# Patient Record
Sex: FEMALE | Race: OTHER | Employment: OTHER | URBAN - METROPOLITAN AREA
[De-identification: names, ages, dates, MRNs, and addresses within clinical notes are randomized per-mention and may not be internally consistent; named-entity substitution may affect disease eponyms.]

---

## 2018-09-01 ENCOUNTER — HOSPITAL ENCOUNTER (INPATIENT)
Facility: HOSPITAL | Age: 83
LOS: 1 days | Discharge: HOME HEALTH | DRG: 552 | End: 2018-09-02
Attending: EMERGENCY MEDICINE | Admitting: HOSPITALIST
Payer: MEDICARE

## 2018-09-01 ENCOUNTER — APPOINTMENT (OUTPATIENT)
Dept: MRI IMAGING | Facility: HOSPITAL | Age: 83
DRG: 552 | End: 2018-09-01
Attending: EMERGENCY MEDICINE
Payer: MEDICARE

## 2018-09-01 ENCOUNTER — APPOINTMENT (OUTPATIENT)
Dept: CT IMAGING | Facility: HOSPITAL | Age: 83
DRG: 552 | End: 2018-09-01
Attending: EMERGENCY MEDICINE
Payer: MEDICARE

## 2018-09-01 DIAGNOSIS — R29.898 WEAKNESS OF RIGHT LOWER EXTREMITY: Primary | ICD-10-CM

## 2018-09-01 LAB
ANION GAP SERPL CALC-SCNC: 11 MMOL/L (ref 0–18)
BASOPHILS # BLD: 0 K/UL (ref 0–0.2)
BASOPHILS NFR BLD: 1 %
BILIRUB UR QL: NEGATIVE
BUN SERPL-MCNC: 38 MG/DL (ref 8–20)
BUN/CREAT SERPL: 22.4 (ref 10–20)
CALCIUM SERPL-MCNC: 9.2 MG/DL (ref 8.5–10.5)
CHLORIDE SERPL-SCNC: 106 MMOL/L (ref 95–110)
CLARITY UR: CLEAR
CO2 SERPL-SCNC: 20 MMOL/L (ref 22–32)
COLOR UR: YELLOW
CREAT SERPL-MCNC: 1.7 MG/DL (ref 0.5–1.5)
EOSINOPHIL # BLD: 0.1 K/UL (ref 0–0.7)
EOSINOPHIL NFR BLD: 1 %
ERYTHROCYTE [DISTWIDTH] IN BLOOD BY AUTOMATED COUNT: 14.4 % (ref 11–15)
GLUCOSE SERPL-MCNC: 116 MG/DL (ref 70–99)
GLUCOSE UR-MCNC: NEGATIVE MG/DL
HCT VFR BLD AUTO: 40.3 % (ref 35–48)
HGB BLD-MCNC: 13.4 G/DL (ref 12–16)
HYALINE CASTS #/AREA URNS AUTO: 1 /LPF
KETONES UR-MCNC: NEGATIVE MG/DL
LYMPHOCYTES # BLD: 0.8 K/UL (ref 1–4)
LYMPHOCYTES NFR BLD: 12 %
MCH RBC QN AUTO: 29.1 PG (ref 27–32)
MCHC RBC AUTO-ENTMCNC: 33.2 G/DL (ref 32–37)
MCV RBC AUTO: 87.7 FL (ref 80–100)
MONOCYTES # BLD: 0.4 K/UL (ref 0–1)
MONOCYTES NFR BLD: 6 %
NEUTROPHILS # BLD AUTO: 5.2 K/UL (ref 1.8–7.7)
NEUTROPHILS NFR BLD: 80 %
NITRITE UR QL STRIP.AUTO: NEGATIVE
OSMOLALITY UR CALC.SUM OF ELEC: 294 MOSM/KG (ref 275–295)
PH UR: 5 [PH] (ref 5–8)
PLATELET # BLD AUTO: 158 K/UL (ref 140–400)
PMV BLD AUTO: 10.6 FL (ref 7.4–10.3)
POTASSIUM SERPL-SCNC: 4.1 MMOL/L (ref 3.3–5.1)
PROT UR-MCNC: NEGATIVE MG/DL
RBC # BLD AUTO: 4.6 M/UL (ref 3.7–5.4)
RBC #/AREA URNS AUTO: 2 /HPF
SODIUM SERPL-SCNC: 137 MMOL/L (ref 136–144)
SP GR UR STRIP: 1.01 (ref 1–1.03)
UROBILINOGEN UR STRIP-ACNC: <2
VIT C UR-MCNC: NEGATIVE MG/DL
WBC # BLD AUTO: 6.4 K/UL (ref 4–11)
WBC #/AREA URNS AUTO: 1 /HPF

## 2018-09-01 PROCEDURE — 99222 1ST HOSP IP/OBS MODERATE 55: CPT | Performed by: HOSPITALIST

## 2018-09-01 PROCEDURE — 70450 CT HEAD/BRAIN W/O DYE: CPT | Performed by: EMERGENCY MEDICINE

## 2018-09-01 PROCEDURE — 72148 MRI LUMBAR SPINE W/O DYE: CPT | Performed by: EMERGENCY MEDICINE

## 2018-09-01 RX ORDER — METOPROLOL SUCCINATE 50 MG/1
50 TABLET, EXTENDED RELEASE ORAL DAILY
COMMUNITY

## 2018-09-01 RX ORDER — HEPARIN SODIUM 5000 [USP'U]/ML
5000 INJECTION, SOLUTION INTRAVENOUS; SUBCUTANEOUS EVERY 12 HOURS SCHEDULED
Status: COMPLETED | OUTPATIENT
Start: 2018-09-01 | End: 2018-09-01

## 2018-09-01 RX ORDER — SODIUM PHOSPHATE, DIBASIC AND SODIUM PHOSPHATE, MONOBASIC 7; 19 G/133ML; G/133ML
1 ENEMA RECTAL ONCE AS NEEDED
Status: DISCONTINUED | OUTPATIENT
Start: 2018-09-01 | End: 2018-09-02

## 2018-09-01 RX ORDER — MORPHINE SULFATE 2 MG/ML
2 INJECTION, SOLUTION INTRAMUSCULAR; INTRAVENOUS EVERY 2 HOUR PRN
Status: DISCONTINUED | OUTPATIENT
Start: 2018-09-01 | End: 2018-09-02

## 2018-09-01 RX ORDER — MORPHINE SULFATE 4 MG/ML
4 INJECTION, SOLUTION INTRAMUSCULAR; INTRAVENOUS EVERY 2 HOUR PRN
Status: DISCONTINUED | OUTPATIENT
Start: 2018-09-01 | End: 2018-09-02

## 2018-09-01 RX ORDER — HYDROCHLOROTHIAZIDE 25 MG/1
25 TABLET ORAL DAILY
Status: ON HOLD | COMMUNITY
End: 2018-09-01

## 2018-09-01 RX ORDER — LEVOTHYROXINE SODIUM 88 UG/1
88 TABLET ORAL
Status: DISCONTINUED | OUTPATIENT
Start: 2018-09-02 | End: 2018-09-02

## 2018-09-01 RX ORDER — ATORVASTATIN CALCIUM 20 MG/1
20 TABLET, FILM COATED ORAL NIGHTLY
Status: DISCONTINUED | OUTPATIENT
Start: 2018-09-01 | End: 2018-09-02

## 2018-09-01 RX ORDER — METOPROLOL SUCCINATE 50 MG/1
50 TABLET, EXTENDED RELEASE ORAL DAILY
Status: DISCONTINUED | OUTPATIENT
Start: 2018-09-02 | End: 2018-09-02

## 2018-09-01 RX ORDER — ALLOPURINOL 100 MG/1
100 TABLET ORAL DAILY
Status: DISCONTINUED | OUTPATIENT
Start: 2018-09-02 | End: 2018-09-02

## 2018-09-01 RX ORDER — ASPIRIN 81 MG/1
81 TABLET ORAL DAILY
Status: DISCONTINUED | OUTPATIENT
Start: 2018-09-02 | End: 2018-09-02

## 2018-09-01 RX ORDER — LORAZEPAM 2 MG/ML
0.5 INJECTION INTRAMUSCULAR
Status: ACTIVE | OUTPATIENT
Start: 2018-09-01 | End: 2018-09-02

## 2018-09-01 RX ORDER — ATORVASTATIN CALCIUM 20 MG/1
20 TABLET, FILM COATED ORAL NIGHTLY
COMMUNITY

## 2018-09-01 RX ORDER — SODIUM CHLORIDE 9 MG/ML
125 INJECTION, SOLUTION INTRAVENOUS CONTINUOUS
Status: DISCONTINUED | OUTPATIENT
Start: 2018-09-01 | End: 2018-09-02

## 2018-09-01 RX ORDER — AMLODIPINE BESYLATE 5 MG/1
5 TABLET ORAL DAILY
COMMUNITY

## 2018-09-01 RX ORDER — LISINOPRIL 40 MG/1
40 TABLET ORAL DAILY
COMMUNITY

## 2018-09-01 RX ORDER — POLYETHYLENE GLYCOL 3350 17 G/17G
17 POWDER, FOR SOLUTION ORAL DAILY PRN
Status: DISCONTINUED | OUTPATIENT
Start: 2018-09-01 | End: 2018-09-02

## 2018-09-01 RX ORDER — ACETAMINOPHEN 325 MG/1
650 TABLET ORAL EVERY 6 HOURS PRN
Status: DISCONTINUED | OUTPATIENT
Start: 2018-09-01 | End: 2018-09-02

## 2018-09-01 RX ORDER — MORPHINE SULFATE 2 MG/ML
1 INJECTION, SOLUTION INTRAMUSCULAR; INTRAVENOUS EVERY 2 HOUR PRN
Status: DISCONTINUED | OUTPATIENT
Start: 2018-09-01 | End: 2018-09-02

## 2018-09-01 RX ORDER — ASPIRIN 81 MG/1
81 TABLET ORAL DAILY
COMMUNITY

## 2018-09-01 RX ORDER — BISACODYL 10 MG
10 SUPPOSITORY, RECTAL RECTAL
Status: DISCONTINUED | OUTPATIENT
Start: 2018-09-01 | End: 2018-09-02

## 2018-09-01 RX ORDER — SODIUM CHLORIDE 0.9 % (FLUSH) 0.9 %
3 SYRINGE (ML) INJECTION AS NEEDED
Status: DISCONTINUED | OUTPATIENT
Start: 2018-09-01 | End: 2018-09-02

## 2018-09-01 RX ORDER — ONDANSETRON 2 MG/ML
4 INJECTION INTRAMUSCULAR; INTRAVENOUS EVERY 6 HOURS PRN
Status: DISCONTINUED | OUTPATIENT
Start: 2018-09-01 | End: 2018-09-02

## 2018-09-01 RX ORDER — LEVOTHYROXINE SODIUM 88 UG/1
88 TABLET ORAL
COMMUNITY

## 2018-09-01 RX ORDER — AMLODIPINE BESYLATE 5 MG/1
5 TABLET ORAL DAILY
Status: DISCONTINUED | OUTPATIENT
Start: 2018-09-02 | End: 2018-09-02

## 2018-09-01 RX ORDER — LISINOPRIL 40 MG/1
40 TABLET ORAL DAILY
Status: DISCONTINUED | OUTPATIENT
Start: 2018-09-02 | End: 2018-09-02

## 2018-09-01 NOTE — ED NOTES
Pt from triage in wheelchair with family. Able to move from wheelchair to bed w/ stand by assist. Pt changing into gown at this time. To ED for evaluation of right leg pain since last night. Pt able to bear weight to move into bed. Pt in NAD.  Family at bed

## 2018-09-01 NOTE — ED NOTES
Pt reports issue with right leg beginning last night. She does report dx of gout to RLE which has improved and she states this is different than previous gout flares.  Pt states that she couldn't lift her leg up but she denies any difficulty or inability to

## 2018-09-01 NOTE — ED INITIAL ASSESSMENT (HPI)
PATIENT COMPLAINS OF RIGHT LEG PAIN AND DIFFICULTY MOVING SINCE LAST NIGHT.   PATIENT STATES SHE CAN AMBULATE WITHOUT DIFFICULTY BUT UNABLE TO LIFT LEFT LEG

## 2018-09-01 NOTE — ED NOTES
Urine collected and UA in process. MRI checklist complete. Pt to CT on cart at this time with plan to go to MRI from CT. All jewelry and clothing removed prior to imaging and given to family. Patient agreeable to get labs following imaging.  Family remains

## 2018-09-01 NOTE — ED PROVIDER NOTES
Patient Seen in: [facility] AND [facility] Emergency Department     History   Patient presents with:  Pain (neurologic)    Stated Complaint:     HPI    80year old female complains of weakness in her right leg since this morning.   Patient reports that over the pas stated in HPI.     Physical Exam   ED Triage Vitals [09/01/18 1409]  BP: 120/69  Pulse: 84  Resp: 20  Temp: 97.9 °F (36.6 °C)  Temp src: Oral  SpO2: 99 %  O2 Device: None (Room air)    Current:/81   Pulse 118   Temp 97.9 °F (36.6 °C) (Oral)   Resp 18 All other components within normal limits   BASIC METABOLIC PANEL (8)   CBC WITH DIFFERENTIAL WITH PLATELET   RAINBOW DRAW BLUE   RAINBOW DRAW LAVENDER   RAINBOW DRAW DARK GREEN   RAINBOW DRAW LIGHT GREEN   RAINBOW DRAW GOLD   RAINBOW DRAW LAVENDER TALL (B Pending)    ED Medications Administered: Medications - No data to display      Procedures: None     09/01/18  1409 09/01/18  1531 09/01/18  1615   BP: 120/69 153/76 153/81   Pulse: 84 95 118   Resp: 20 16 18   Temp: 97.9 °F (36.6 °C)     TempSrc: Oral After review and interpretation of the above emergency department workup, the patient was found to have Weakness of right lower extremity  (primary encounter diagnosis)    Plan: General supportive care recommendations given to patient.   Admit for MRI, PT O

## 2018-09-02 ENCOUNTER — APPOINTMENT (OUTPATIENT)
Dept: ULTRASOUND IMAGING | Facility: HOSPITAL | Age: 83
DRG: 552 | End: 2018-09-02
Attending: HOSPITALIST
Payer: MEDICARE

## 2018-09-02 VITALS
BODY MASS INDEX: 35.24 KG/M2 | WEIGHT: 191.5 LBS | OXYGEN SATURATION: 98 % | HEIGHT: 62 IN | DIASTOLIC BLOOD PRESSURE: 70 MMHG | RESPIRATION RATE: 18 BRPM | HEART RATE: 84 BPM | SYSTOLIC BLOOD PRESSURE: 144 MMHG | TEMPERATURE: 98 F

## 2018-09-02 LAB
ANION GAP SERPL CALC-SCNC: 4 MMOL/L (ref 0–18)
BASOPHILS # BLD: 0 K/UL (ref 0–0.2)
BASOPHILS NFR BLD: 1 %
BUN SERPL-MCNC: 31 MG/DL (ref 8–20)
BUN/CREAT SERPL: 19.4 (ref 10–20)
CALCIUM SERPL-MCNC: 8.4 MG/DL (ref 8.5–10.5)
CHLORIDE SERPL-SCNC: 114 MMOL/L (ref 95–110)
CO2 SERPL-SCNC: 23 MMOL/L (ref 22–32)
CREAT SERPL-MCNC: 1.6 MG/DL (ref 0.5–1.5)
EOSINOPHIL # BLD: 0.2 K/UL (ref 0–0.7)
EOSINOPHIL NFR BLD: 4 %
ERYTHROCYTE [DISTWIDTH] IN BLOOD BY AUTOMATED COUNT: 14.4 % (ref 11–15)
GLUCOSE SERPL-MCNC: 83 MG/DL (ref 70–99)
HCT VFR BLD AUTO: 32.3 % (ref 35–48)
HGB BLD-MCNC: 11 G/DL (ref 12–16)
LYMPHOCYTES # BLD: 1 K/UL (ref 1–4)
LYMPHOCYTES NFR BLD: 22 %
MCH RBC QN AUTO: 29.7 PG (ref 27–32)
MCHC RBC AUTO-ENTMCNC: 34 G/DL (ref 32–37)
MCV RBC AUTO: 87.5 FL (ref 80–100)
MONOCYTES # BLD: 0.6 K/UL (ref 0–1)
MONOCYTES NFR BLD: 12 %
NEUTROPHILS # BLD AUTO: 2.8 K/UL (ref 1.8–7.7)
NEUTROPHILS NFR BLD: 61 %
OSMOLALITY UR CALC.SUM OF ELEC: 298 MOSM/KG (ref 275–295)
PLATELET # BLD AUTO: 133 K/UL (ref 140–400)
PMV BLD AUTO: 10.4 FL (ref 7.4–10.3)
POTASSIUM SERPL-SCNC: 4.4 MMOL/L (ref 3.3–5.1)
RBC # BLD AUTO: 3.69 M/UL (ref 3.7–5.4)
SODIUM SERPL-SCNC: 141 MMOL/L (ref 136–144)
WBC # BLD AUTO: 4.7 K/UL (ref 4–11)

## 2018-09-02 PROCEDURE — 76770 US EXAM ABDO BACK WALL COMP: CPT | Performed by: HOSPITALIST

## 2018-09-02 RX ORDER — SODIUM CHLORIDE 9 MG/ML
INJECTION, SOLUTION INTRAVENOUS
Status: COMPLETED
Start: 2018-09-02 | End: 2018-09-02

## 2018-09-02 RX ORDER — SODIUM CHLORIDE 9 MG/ML
INJECTION, SOLUTION INTRAVENOUS
Status: DISCONTINUED
Start: 2018-09-02 | End: 2018-09-02

## 2018-09-02 NOTE — CM/SW NOTE
9/2CM-MD orders received in regards to discharge planning-[facility] The Patient is [place] 71. This Writer made a referral to [facility] ([id number]) [place] [address] for consult.     -[facility] [id number]

## 2018-09-02 NOTE — PHYSICAL THERAPY NOTE
PHYSICAL THERAPY EVALUATION - INPATIENT     Room Number: 547B/547-B  Evaluation Date: 9/2/2018  Type of Evaluation:initial  Physician Order: PT Eval and Treat    Presenting Problem: Pt admitted w/ sudden RLE weakness  Reason for Therapy: Mobility Dysfunct PT    PLAN  PT Treatment Plan: Bed mobility; Body mechanics;Gait training;Strengthening;Stoop training;Stair training;Transfer training;Balance training  Rehab Potential : Good  Frequency (Obs):  (3-5/wk)       PHYSICAL THERAPY MEDICAL/SOCIAL HISTORY     Hi chair with arms (e.g., wheelchair, bedside commode, etc.): A Little   -   Moving from lying on back to sitting on the side of the bed?: A Little   How much help from another person does the patient currently need. ..   -   Moving to and from a bed to a jatin

## 2018-09-02 NOTE — PLAN OF CARE
Problem: Patient Centered Care  Goal: Patient preferences are identified and integrated in the patient's plan of care  Interventions:  - What would you like us to know as we care for you?  \"Sometimes I use my walker to help with my balance\"   - Provide ti Manage/alleviate anxiety  - Utilize distraction and/or relaxation techniques  - Monitor for opioid side effects  - Notify MD/LIP if interventions unsuccessful or patient reports new pain  - Anticipate increased pain with activity and pre-medicate as approp

## 2018-09-02 NOTE — ED NOTES
RN to call back for report. IV established. VSS. Patient agreeable and comfortable with POC/Admission. She denies any pain at the moment. Family at bedside. Patient in NAD. IV SL. Awaiting call back for admission.

## 2018-09-02 NOTE — OCCUPATIONAL THERAPY NOTE
OCCUPATIONAL THERAPY EVALUATION - INPATIENT     Room Number: 547B/547-B  Evaluation Date: 9/2/2018  Type of Evaluation: Initial  Presenting Problem:  (Weakness RLE)    Physician Order: IP Consult to Occupational Therapy  Reason for Therapy: ADL/IADL Dysfun Layout: One level  Lives With: Alone     Toilet and Equipment: Standard height toilet  Shower/Tub and Equipment: Tub-shower combo;Grab bar; Shower chair          Hand Dominance: Right  Drives: No  Patient Regularly Uses:  (Binoculars)    Stairs in Home: 1ST 32.79%  Standardized Score (AM-PAC Scale): 44.27  CMS Modifier (G-Code): CJ    FUNCTIONAL TRANSFER ASSESSMENT  Supine to Sit : Not tested (Out of bed at this time)  Sit to Stand:  (CGA)  Toilet Transfer: CGA w/RW   Shower Transfer: n/t   Chair Transfer: CG

## 2018-09-02 NOTE — PLAN OF CARE
Impaired Functional Mobility    • Achieve highest/safest level of mobility/gait Progressing        PAIN - ADULT    • Verbalizes/displays adequate comfort level or patient's stated pain goal Progressing        Patient Centered Care    • Patient preferences

## 2018-09-02 NOTE — HOME CARE LIAISON
Met with patient at the bedside. Patient is agreeable to [facility]. Brochure and liaison's business card provided with contact information. All questions addressed and answered.

## 2019-07-24 NOTE — H&P
[facility]  : 1929    Status: Emergency  Day #: 0    Attending: Krista Sandoval MD  PCP: Aamir Root MD     Date of Encounter:  2018  Date of Admission:  2018     Chief Complaint: R leg Mom [facility]   The pharm took verbal and will get ready to dispense to family    (VITAMIN D-3) 5000 units Oral Tab,  Take 1 tablet by mouth. ALLOPURINOL OR,  Take by mouth. Potassium (POTASSIMIN OR),  Take by mouth. FUROSEMIDE OR,  Take by mouth. hydrochlorothiazide 25 MG Oral Tab,  Take 25 mg by mouth daily.      Review of Syst MD

## 2020-12-01 ENCOUNTER — LAB REQUISITION (OUTPATIENT)
Dept: LAB | Facility: HOSPITAL | Age: 85
End: 2020-12-01
Payer: MEDICARE

## 2020-12-01 DIAGNOSIS — Z01.818 ENCOUNTER FOR OTHER PREPROCEDURAL EXAMINATION: ICD-10-CM

## 2020-12-08 ENCOUNTER — LAB REQUISITION (OUTPATIENT)
Dept: LAB | Facility: HOSPITAL | Age: 85
End: 2020-12-08
Payer: MEDICARE

## 2020-12-08 DIAGNOSIS — Z01.818 ENCOUNTER FOR OTHER PREPROCEDURAL EXAMINATION: ICD-10-CM

## 2022-02-11 ENCOUNTER — APPOINTMENT (OUTPATIENT)
Dept: ULTRASOUND IMAGING | Facility: HOSPITAL | Age: 87
End: 2022-02-11
Attending: EMERGENCY MEDICINE
Payer: MEDICARE

## 2022-02-11 ENCOUNTER — HOSPITAL ENCOUNTER (EMERGENCY)
Facility: HOSPITAL | Age: 87
Discharge: HOME | End: 2022-02-11
Attending: EMERGENCY MEDICINE
Payer: MEDICARE

## 2022-02-11 VITALS
DIASTOLIC BLOOD PRESSURE: 55 MMHG | SYSTOLIC BLOOD PRESSURE: 133 MMHG | WEIGHT: 166 LBS | HEART RATE: 74 BPM | BODY MASS INDEX: 32.59 KG/M2 | OXYGEN SATURATION: 97 % | HEIGHT: 60 IN | RESPIRATION RATE: 16 BRPM | TEMPERATURE: 97 F

## 2022-02-11 DIAGNOSIS — I82.402 ACUTE DEEP VEIN THROMBOSIS (DVT) OF LEFT LOWER EXTREMITY, UNSPECIFIED VEIN (HCC): Primary | ICD-10-CM

## 2022-02-11 LAB
ANION GAP SERPL CALC-SCNC: 7 MMOL/L (ref 0–18)
BASOPHILS # BLD AUTO: 0.03 X10(3) UL (ref 0–0.2)
BASOPHILS NFR BLD AUTO: 0.4 %
BUN BLD-MCNC: 46 MG/DL (ref 7–18)
BUN/CREAT SERPL: 20.2 (ref 10–20)
CALCIUM BLD-MCNC: 9.6 MG/DL (ref 8.5–10.1)
CHLORIDE SERPL-SCNC: 106 MMOL/L (ref 98–112)
CO2 SERPL-SCNC: 26 MMOL/L (ref 21–32)
CREAT BLD-MCNC: 2.28 MG/DL
DEPRECATED RDW RBC AUTO: 48 FL (ref 35.1–46.3)
EOSINOPHIL # BLD AUTO: 0.08 X10(3) UL (ref 0–0.7)
EOSINOPHIL NFR BLD AUTO: 1.1 %
ERYTHROCYTE [DISTWIDTH] IN BLOOD BY AUTOMATED COUNT: 13.9 % (ref 11–15)
GLUCOSE BLD-MCNC: 148 MG/DL (ref 70–99)
HCT VFR BLD AUTO: 40.8 %
HGB BLD-MCNC: 13.1 G/DL
IMM GRANULOCYTES # BLD AUTO: 0.03 X10(3) UL (ref 0–1)
IMM GRANULOCYTES NFR BLD: 0.4 %
LYMPHOCYTES # BLD AUTO: 0.91 X10(3) UL (ref 1–4)
LYMPHOCYTES NFR BLD AUTO: 12.5 %
MCH RBC QN AUTO: 30.2 PG (ref 26–34)
MCHC RBC AUTO-ENTMCNC: 32.1 G/DL (ref 31–37)
MCV RBC AUTO: 94 FL
MONOCYTES # BLD AUTO: 0.62 X10(3) UL (ref 0.1–1)
MONOCYTES NFR BLD AUTO: 8.5 %
NEUTROPHILS # BLD AUTO: 5.61 X10 (3) UL (ref 1.5–7.7)
NEUTROPHILS # BLD AUTO: 5.61 X10(3) UL (ref 1.5–7.7)
NEUTROPHILS NFR BLD AUTO: 77.1 %
OSMOLALITY SERPL CALC.SUM OF ELEC: 303 MOSM/KG (ref 275–295)
PLATELET # BLD AUTO: 153 10(3)UL (ref 150–450)
POTASSIUM SERPL-SCNC: 4.4 MMOL/L (ref 3.5–5.1)
RBC # BLD AUTO: 4.34 X10(6)UL
SODIUM SERPL-SCNC: 139 MMOL/L (ref 136–145)
WBC # BLD AUTO: 7.3 X10(3) UL (ref 4–11)

## 2022-02-11 PROCEDURE — 80048 BASIC METABOLIC PNL TOTAL CA: CPT | Performed by: EMERGENCY MEDICINE

## 2022-02-11 PROCEDURE — 85025 COMPLETE CBC W/AUTO DIFF WBC: CPT | Performed by: EMERGENCY MEDICINE

## 2022-02-11 PROCEDURE — 36415 COLL VENOUS BLD VENIPUNCTURE: CPT

## 2022-02-11 PROCEDURE — 99284 EMERGENCY DEPT VISIT MOD MDM: CPT

## 2022-02-11 PROCEDURE — 93971 EXTREMITY STUDY: CPT | Performed by: EMERGENCY MEDICINE

## 2022-02-11 NOTE — ED INITIAL ASSESSMENT (HPI)
Pt to the ed from home for swelling of the LLE. Pt was being treated at home for cellulitis. [facility] advised her to come to the ed for further evaluation.

## 2022-02-11 NOTE — ED QUICK NOTES
Assumed care of Loreta upon arrival in room 21 via triage. Son at bedside. Patient A&Ox4, see triage note and nursing assessment. She appears in no acute distress at this time. IV placed and all available specimens to lab. Call button in reach and son Peggy Guy at bedside. Will continue to monitor.

## 2022-02-12 NOTE — CM/SW NOTE
Dr. Rhoda Villarreal requesting NIECY to assist with providing patient a free 30 day trial offer coupon for Eliquis. Dr. Rhoda Villarreal already e-scribed Eliquis RX to [facility] in [place] [address] located at [address] [place] [address] for Eliquis 5mg take 2 BID x 7 days followed by Eliquis 5mg 1 BID x 21 days #74 with no refills. NIECY called and spoke with [facility] Pharmacist at [facility] in [place] [address] @ [phone] and she confirmed she received the above escript. Provided [facility] Pharmacist with Eliquis 30 day trial offer coupon information. Per [facility] pharmacist Eliquis RX went through for no charge with coupon. NIECY met with patient and her son at pt's bedside and informed them of the above. Patient and son instructed on Eliquis dosing instructions and aware they need to schedule an ER F/U appointment with pt's PCP for further Eliquis dosing after pt completes the above 28 day supply. Patient and son v/u on the above. Also provided pt and son with coupon ERCM used in case pharmacy may need it for any reason - patient and son aware the coupon is for a one time use only. Also informed patient and son the above [facility] pharmacy is open until 10PM tonight. Patient was given her first Eliquis dose tonight in ER - and aware to start the Eliquis RX in AM. Son and patient v/u on all of the above. ER-T notified patient ready for discharge. ER-T will obtain wheelchair and assist patient to [facility] via wheelchair and into son's car. Dr. Rhoda Villarreal updated on all of the above.

## 2022-02-12 NOTE — ED QUICK NOTES
Patient discharged home in no acute distress in care of son. A&Ox4, skin p/w/d, denies cp/sob. Ambulating with steady gait and verbalized understanding of d/c instructions and prescriptions. Provided wheelchair to ED exit.

## 2022-02-12 NOTE — ED QUICK NOTES
Rounding Completed    Plan of Care reviewed. Waiting for ultrasound results  Elimination needs assessed. Vss. No current comfort needs at this time. Bed is locked and in lowest position. Call light within reach.

## 2022-03-23 ENCOUNTER — APPOINTMENT (OUTPATIENT)
Dept: GENERAL RADIOLOGY | Facility: HOSPITAL | Age: 87
End: 2022-03-23
Payer: MEDICARE

## 2022-03-23 ENCOUNTER — HOSPITAL ENCOUNTER (OUTPATIENT)
Facility: HOSPITAL | Age: 87
Setting detail: OBSERVATION
Discharge: HOME | End: 2022-03-24
Admitting: HOSPITALIST
Payer: MEDICARE

## 2022-03-23 DIAGNOSIS — R06.00 DYSPNEA, UNSPECIFIED TYPE: Primary | ICD-10-CM

## 2022-03-23 LAB
ANION GAP SERPL CALC-SCNC: 8 MMOL/L (ref 0–18)
BASOPHILS # BLD AUTO: 0.03 X10(3) UL (ref 0–0.2)
BASOPHILS NFR BLD AUTO: 0.4 %
BUN BLD-MCNC: 45 MG/DL (ref 7–18)
BUN/CREAT SERPL: 25.7 (ref 10–20)
CALCIUM BLD-MCNC: 9.4 MG/DL (ref 8.5–10.1)
CHLORIDE SERPL-SCNC: 107 MMOL/L (ref 98–112)
CO2 SERPL-SCNC: 26 MMOL/L (ref 21–32)
CREAT BLD-MCNC: 1.75 MG/DL
D DIMER PPP FEU-MCNC: 0.47 UG/ML FEU (ref ?–0.92)
DEPRECATED RDW RBC AUTO: 48.4 FL (ref 35.1–46.3)
EOSINOPHIL # BLD AUTO: 0.07 X10(3) UL (ref 0–0.7)
EOSINOPHIL NFR BLD AUTO: 0.8 %
ERYTHROCYTE [DISTWIDTH] IN BLOOD BY AUTOMATED COUNT: 14.3 % (ref 11–15)
GLUCOSE BLD-MCNC: 147 MG/DL (ref 70–99)
HCT VFR BLD AUTO: 40.6 %
HGB BLD-MCNC: 13 G/DL
IMM GRANULOCYTES # BLD AUTO: 0.04 X10(3) UL (ref 0–1)
IMM GRANULOCYTES NFR BLD: 0.5 %
LYMPHOCYTES # BLD AUTO: 0.8 X10(3) UL (ref 1–4)
MCH RBC QN AUTO: 29.4 PG (ref 26–34)
MCHC RBC AUTO-ENTMCNC: 32 G/DL (ref 31–37)
MCV RBC AUTO: 91.9 FL
MONOCYTES # BLD AUTO: 0.57 X10(3) UL (ref 0.1–1)
MONOCYTES NFR BLD AUTO: 6.9 %
NEUTROPHILS # BLD AUTO: 6.79 X10 (3) UL (ref 1.5–7.7)
NEUTROPHILS # BLD AUTO: 6.79 X10(3) UL (ref 1.5–7.7)
NEUTROPHILS NFR BLD AUTO: 81.8 %
NT-PROBNP SERPL-MCNC: 882 PG/ML (ref ?–450)
OSMOLALITY SERPL CALC.SUM OF ELEC: 306 MOSM/KG (ref 275–295)
PLATELET # BLD AUTO: 183 10(3)UL (ref 150–450)
POTASSIUM SERPL-SCNC: 4.1 MMOL/L (ref 3.5–5.1)
RBC # BLD AUTO: 4.42 X10(6)UL
SARS-COV-2 RNA RESP QL NAA+PROBE: NOT DETECTED
SODIUM SERPL-SCNC: 141 MMOL/L (ref 136–145)
TROPONIN I HIGH SENSITIVITY: 6 NG/L
TROPONIN I HIGH SENSITIVITY: <3 NG/L
WBC # BLD AUTO: 8.3 X10(3) UL (ref 4–11)

## 2022-03-23 PROCEDURE — 71045 X-RAY EXAM CHEST 1 VIEW: CPT

## 2022-03-23 PROCEDURE — 99220 INITIAL OBSERVATION CARE,LEVL III: CPT | Performed by: HOSPITALIST

## 2022-03-23 RX ORDER — ONDANSETRON 2 MG/ML
4 INJECTION INTRAMUSCULAR; INTRAVENOUS EVERY 6 HOURS PRN
Status: DISCONTINUED | OUTPATIENT
Start: 2022-03-23 | End: 2022-03-24

## 2022-03-23 RX ORDER — METOPROLOL SUCCINATE 50 MG/1
50 TABLET, EXTENDED RELEASE ORAL DAILY
Status: DISCONTINUED | OUTPATIENT
Start: 2022-03-23 | End: 2022-03-24

## 2022-03-23 RX ORDER — LISINOPRIL 40 MG/1
40 TABLET ORAL DAILY
Status: DISCONTINUED | OUTPATIENT
Start: 2022-03-24 | End: 2022-03-24

## 2022-03-23 RX ORDER — METOCLOPRAMIDE HYDROCHLORIDE 5 MG/ML
5 INJECTION INTRAMUSCULAR; INTRAVENOUS EVERY 8 HOURS PRN
Status: DISCONTINUED | OUTPATIENT
Start: 2022-03-23 | End: 2022-03-24

## 2022-03-23 RX ORDER — ACETAMINOPHEN 325 MG/1
650 TABLET ORAL EVERY 6 HOURS PRN
Status: DISCONTINUED | OUTPATIENT
Start: 2022-03-23 | End: 2022-03-24

## 2022-03-23 RX ORDER — ATORVASTATIN CALCIUM 20 MG/1
20 TABLET, FILM COATED ORAL NIGHTLY
Status: DISCONTINUED | OUTPATIENT
Start: 2022-03-23 | End: 2022-03-24

## 2022-03-23 RX ORDER — LEVOTHYROXINE SODIUM 88 UG/1
88 TABLET ORAL
Status: DISCONTINUED | OUTPATIENT
Start: 2022-03-24 | End: 2022-03-24

## 2022-03-23 RX ORDER — AMLODIPINE BESYLATE 5 MG/1
5 TABLET ORAL DAILY
Status: DISCONTINUED | OUTPATIENT
Start: 2022-03-24 | End: 2022-03-24

## 2022-03-23 RX ORDER — ASPIRIN 81 MG/1
81 TABLET ORAL DAILY
Status: DISCONTINUED | OUTPATIENT
Start: 2022-03-24 | End: 2022-03-24

## 2022-03-23 NOTE — ED INITIAL ASSESSMENT (HPI)
Patient presents to ER with complaints of shortness of breath,   Patient notes began this morning. Dyspnea with exertion.

## 2022-03-23 NOTE — ED QUICK NOTES
Pt to ED with c/o intermittent dyspnea with exertion that started early this morning. Pt denies chest pain. Pt denies cough or fever. Pt on Eliquis and ASA. Pt with hx of DVT. Pt with wounds to bilateral lower legs. Dressing in place from home wound RN upon arrival. No respiratory distress noted. Pt able to speak in full sentences. Lung clear bilaterally. Chest expansion equal. Pt skin parameters WNL. Pt denies black or bloody stool. Pt denies fall or trauma. Pt denies N/V/D. Pt abdomen soft and non distended. IV established to L lateral AC #20G-SL. Cardiac monitor and continuous pulse oximetry on. SR on monitor. Awaiting labs and chest xray. Will continue to monitor.

## 2022-03-23 NOTE — H&P
[facility]    PATIENT'S NAME: Nichol Curry   ATTENDING PHYSICIAN: Karishma Hogue MD   PATIENT ACCOUNT#:   [de-identified]    LOCATION:  [address]  MEDICAL RECORD #:   [id number]       YOB: 1929  ADMISSION DATE:       03/23/2022    HISTORY AND PHYSICAL EXAMINATION    CHIEF COMPLAINT:  Dyspnea on exertion. HISTORY OF PRESENT ILLNESS:  Patient is a 80-year-old  female who was recently diagnosed with lower extremity DVT and started on Eliquis. Today at home, she had 2 episodes of shortness of breath while she was walking from the living room to the kitchen and again back from the kitchen. She told her grandson, who brought her into the emergency room, her symptoms have resolved since. CBC and chemistry are unremarkable. GFR is 25, which is at baseline. ProBNP 882, which is slightly elevated. Troponin and D-dimer were negative. EKG showed sinus rhythm with frequent premature atrial contractions. Chest x-ray showed no acute findings, mild cardiomegaly. Patient will be admitted to the hospital for further management and observation. PAST MEDICAL HISTORY:  Hypertension; DVT, currently on Eliquis for DVT treatment; osteoarthritis; chronic kidney disease stage 3; osteoporosis; hypothyroidism; gout; cerebrovascular accident; bilateral carotid atherosclerosis; cervical and lumbar spine stenosis. PAST SURGICAL HISTORY:  None. MEDICATIONS:  Please see medication reconciliation list.     ALLERGIES:  No known drug allergies. FAMILY HISTORY:  Patient is not able to recall any details about her parents' health. SOCIAL HISTORY:  No tobacco, alcohol, or drug use. Lives with her grandson. Uses a cane to ambulate around. At baseline, independent for basic activities of daily living. REVIEW OF SYSTEMS:  Patient reports that while she was walking from the living room to the kitchen earlier today she felt short of breath.   Coming back from the kitchen, she felt also short of breath, but later on when she was walking to the car and out of the car to the emergency room did not have any shortness of breath. Other 12-point review of systems is negative. Currently asymptomatic. PHYSICAL EXAMINATION:    GENERAL:  Alert and oriented to time, place and person. Pleasant. No acute distress. VITAL SIGNS:  Temperature 97.7, pulse 76, respiratory rate 20, blood pressure 130/74, pulse ox 96% on room air. HEENT:  Atraumatic. Oropharynx clear. Moist mucous membranes. Normal hard and soft palate. Eyes:  Anicteric sclerae. NECK:  Supple. No lymphadenopathy. Trachea midline. Full range of motion. LUNGS:  Clear to auscultation bilaterally. Normal respiratory effort. HEART:  Regular rate and rhythm. S1 and S2 auscultated. ABDOMEN:  Soft, nondistended. No tenderness. Positive bowel sounds. EXTREMITIES:  Chronic stasis dermatitis, +1 edema. No clubbing or cyanosis. NEUROLOGIC:  Motor and sensory intact. Cranial nerves II to XII are intact. ASSESSMENT:    1.   Episodic dyspnea on exertion without chest pain. 2.   Chronic kidney disease stage 3 to 4.  3.   Hypertension. 4.   History of recent deep vein thrombosis, currently taking full anticoagulation with Eliquis. PLAN:  Patient will be admitted to telemetry floor for observation. Trend her troponins. Obtain Lexiscan stress test in the morning. Further recommendations to follow.      Dictated By Dany Bashir MD  d: 03/23/2022 16:19:56  t: 03/23/2022 17:46:18  Job [id number]  SY/

## 2022-03-23 NOTE — ED QUICK NOTES
Orders for admission, patient is aware of plan and ready to go upstairs. Any questions, please call ED RN Mariana Shafer  at extension [phone].      Patient Covid vaccination status: Unvaccinated     COVID Test Ordered in ED: Rapid SARS-CoV-2 by PCR-negative    COVID Suspicion at Admission: N/A    Running Infusions:  None    Mental Status/LOC at time of transport: Alert and oriented x 4    Other pertinent information: fall risk  CIWA score: N/A   NIH score:  N/A

## 2022-03-24 ENCOUNTER — APPOINTMENT (OUTPATIENT)
Dept: CV DIAGNOSTICS | Facility: HOSPITAL | Age: 87
End: 2022-03-24
Attending: HOSPITALIST
Payer: MEDICARE

## 2022-03-24 ENCOUNTER — APPOINTMENT (OUTPATIENT)
Dept: NUCLEAR MEDICINE | Facility: HOSPITAL | Age: 87
End: 2022-03-24
Attending: HOSPITALIST
Payer: MEDICARE

## 2022-03-24 VITALS
HEART RATE: 87 BPM | HEIGHT: 61 IN | OXYGEN SATURATION: 99 % | BODY MASS INDEX: 31.72 KG/M2 | DIASTOLIC BLOOD PRESSURE: 63 MMHG | WEIGHT: 168 LBS | SYSTOLIC BLOOD PRESSURE: 114 MMHG | TEMPERATURE: 98 F | RESPIRATION RATE: 20 BRPM

## 2022-03-24 LAB
ANION GAP SERPL CALC-SCNC: 7 MMOL/L (ref 0–18)
BASOPHILS # BLD AUTO: 0.03 X10(3) UL (ref 0–0.2)
BASOPHILS NFR BLD AUTO: 0.5 %
BUN BLD-MCNC: 39 MG/DL (ref 7–18)
BUN/CREAT SERPL: 26.7 (ref 10–20)
CALCIUM BLD-MCNC: 8.8 MG/DL (ref 8.5–10.1)
CHLORIDE SERPL-SCNC: 111 MMOL/L (ref 98–112)
CO2 SERPL-SCNC: 26 MMOL/L (ref 21–32)
CREAT BLD-MCNC: 1.46 MG/DL
DEPRECATED RDW RBC AUTO: 47.8 FL (ref 35.1–46.3)
EOSINOPHIL # BLD AUTO: 0.26 X10(3) UL (ref 0–0.7)
EOSINOPHIL NFR BLD AUTO: 4.4 %
ERYTHROCYTE [DISTWIDTH] IN BLOOD BY AUTOMATED COUNT: 14.2 % (ref 11–15)
GLUCOSE BLD-MCNC: 92 MG/DL (ref 70–99)
HCT VFR BLD AUTO: 37.3 %
HGB BLD-MCNC: 11.9 G/DL
IMM GRANULOCYTES # BLD AUTO: 0.02 X10(3) UL (ref 0–1)
LYMPHOCYTES # BLD AUTO: 0.95 X10(3) UL (ref 1–4)
LYMPHOCYTES NFR BLD AUTO: 16.1 %
MCH RBC QN AUTO: 29.2 PG (ref 26–34)
MCHC RBC AUTO-ENTMCNC: 31.9 G/DL (ref 31–37)
MCV RBC AUTO: 91.6 FL
MONOCYTES # BLD AUTO: 0.56 X10(3) UL (ref 0.1–1)
MONOCYTES NFR BLD AUTO: 9.5 %
NEUTROPHILS # BLD AUTO: 4.09 X10 (3) UL (ref 1.5–7.7)
NEUTROPHILS # BLD AUTO: 4.09 X10(3) UL (ref 1.5–7.7)
NEUTROPHILS NFR BLD AUTO: 69.2 %
OSMOLALITY SERPL CALC.SUM OF ELEC: 307 MOSM/KG (ref 275–295)
PLATELET # BLD AUTO: 177 10(3)UL (ref 150–450)
POTASSIUM SERPL-SCNC: 4.5 MMOL/L (ref 3.5–5.1)
RBC # BLD AUTO: 4.07 X10(6)UL
SODIUM SERPL-SCNC: 144 MMOL/L (ref 136–145)
WBC # BLD AUTO: 5.9 X10(3) UL (ref 4–11)

## 2022-03-24 PROCEDURE — 99217 OBSERVATION CARE DISCHARGE: CPT | Performed by: HOSPITALIST

## 2022-03-24 PROCEDURE — 93017 CV STRESS TEST TRACING ONLY: CPT | Performed by: HOSPITALIST

## 2022-03-24 PROCEDURE — 78452 HT MUSCLE IMAGE SPECT MULT: CPT | Performed by: HOSPITALIST

## 2022-03-24 NOTE — PLAN OF CARE
Patient is alert and oriented x4. On room air, denies SOB, vitals stable. Educated patient on plan of care, general admission education. Call light within reach. Bed in lowest position. All needs addressed. Hourly rounding maintained. Pt to nuclear medicine for stress test. Pt was gone for approximately 3 hours. Pt returned and was stable. Patient worked with physical therapy and moved around the room well. PT to be discharged home with son to help. Pt educated on discharge instructions by this RN, no other questions by pt or family member. PIV removed and pt stable. Problem: Patient Centered Care  Goal: Patient preferences are identified and integrated in the patient's plan of care  Description: Interventions:  - What would you like us to know as we care for you? From home.   - Provide timely, complete, and accurate information to patient/family  - Incorporate patient and family knowledge, values, beliefs, and cultural backgrounds into the planning and delivery of care  - Encourage patient/family to participate in care and decision-making at the level they choose  - Honor patient and family perspectives and choices  Outcome: Progressing     Problem: PAIN - ADULT  Goal: Verbalizes/displays adequate comfort level or patient's stated pain goal  Description: INTERVENTIONS:  - Encourage pt to monitor pain and request assistance  - Assess pain using appropriate pain scale  - Administer analgesics based on type and severity of pain and evaluate response  - Implement non-pharmacological measures as appropriate and evaluate response  - Consider cultural and social influences on pain and pain management  - Manage/alleviate anxiety  - Utilize distraction and/or relaxation techniques  - Monitor for opioid side effects  - Notify MD/LIP if interventions unsuccessful or patient reports new pain  - Anticipate increased pain with activity and pre-medicate as appropriate  Outcome: Progressing     Problem: SAFETY ADULT - FALL  Goal: Free from fall injury  Description: INTERVENTIONS:  - Assess pt frequently for physical needs  - Identify cognitive and physical deficits and behaviors that affect risk of falls.   - [place] fall precautions as indicated by assessment.  - Educate pt/family on patient safety including physical limitations  - Instruct pt to call for assistance with activity based on assessment  - Modify environment to reduce risk of injury  - Provide assistive devices as appropriate  - Consider OT/PT consult to assist with strengthening/mobility  - Encourage toileting schedule  Outcome: Progressing

## 2022-03-24 NOTE — PLAN OF CARE
Problem: Patient Centered Care  Goal: Patient preferences are identified and integrated in the patient's plan of care  Description: Interventions:  - What would you like us to know as we care for you?   - Provide timely, complete, and accurate information to patient/family  - Incorporate patient and family knowledge, values, beliefs, and cultural backgrounds into the planning and delivery of care  - Encourage patient/family to participate in care and decision-making at the level they choose  - Honor patient and family perspectives and choices  Outcome: Progressing     Problem: Patient/Family Goals  Goal: Patient/Family Long Term Goal  Description: Patient's Long Term Goal:     Interventions:  -   - See additional Care Plan goals for specific interventions  Outcome: Progressing  Goal: Patient/Family Short Term Goal  Description: Patient's Short Term Goal:     Interventions:   -   - See additional Care Plan goals for specific interventions  Outcome: Progressing     Problem: RESPIRATORY - ADULT  Goal: Achieves optimal ventilation and oxygenation  Description: INTERVENTIONS:  - Assess for changes in respiratory status  - Assess for changes in mentation and behavior  - Position to facilitate oxygenation and minimize respiratory effort  - Oxygen supplementation based on oxygen saturation or ABGs  - Provide Smoking Cessation handout, if applicable  - Encourage broncho-pulmonary hygiene including cough, deep breathe, Incentive Spirometry  - Assess the need for suctioning and perform as needed  - Assess and instruct to report SOB or any respiratory difficulty  - Respiratory Therapy support as indicated  - Manage/alleviate anxiety  - Monitor for signs/symptoms of CO2 retention  Outcome: Progressing     Problem: SKIN/TISSUE INTEGRITY - ADULT  Goal: Incision(s), wounds(s) or drain site(s) healing without S/S of infection  Description: INTERVENTIONS:  - Assess and document risk factors for pressure ulcer development  - Assess and document skin integrity  - Assess and document dressing/incision, wound bed, drain sites and surrounding tissue  - Implement wound care per orders  - Initiate isolation precautions as appropriate  - Initiate Pressure Ulcer prevention bundle as indicated  Outcome: Progressing     Problem: PAIN - ADULT  Goal: Verbalizes/displays adequate comfort level or patient's stated pain goal  Description: INTERVENTIONS:  - Encourage pt to monitor pain and request assistance  - Assess pain using appropriate pain scale  - Administer analgesics based on type and severity of pain and evaluate response  - Implement non-pharmacological measures as appropriate and evaluate response  - Consider cultural and social influences on pain and pain management  - Manage/alleviate anxiety  - Utilize distraction and/or relaxation techniques  - Monitor for opioid side effects  - Notify MD/LIP if interventions unsuccessful or patient reports new pain  - Anticipate increased pain with activity and pre-medicate as appropriate  Outcome: Progressing     Problem: RISK FOR INFECTION - ADULT  Goal: Absence of fever/infection during anticipated neutropenic period  Description: INTERVENTIONS  - Monitor WBC  - Administer growth factors as ordered  - Implement neutropenic guidelines  Outcome: Progressing     Problem: SAFETY ADULT - FALL  Goal: Free from fall injury  Description: INTERVENTIONS:  - Assess pt frequently for physical needs  - Identify cognitive and physical deficits and behaviors that affect risk of falls.   - [place] fall precautions as indicated by assessment.  - Educate pt/family on patient safety including physical limitations  - Instruct pt to call for assistance with activity based on assessment  - Modify environment to reduce risk of injury  - Provide assistive devices as appropriate  - Consider OT/PT consult to assist with strengthening/mobility  - Encourage toileting schedule  Outcome: Progressing     Problem: DISCHARGE PLANNING  Goal: Discharge to home or other facility with appropriate resources  Description: INTERVENTIONS:  - Identify barriers to discharge w/pt and caregiver  - Include patient/family/discharge partner in discharge planning  - Arrange for needed discharge resources and transportation as appropriate  - Identify discharge learning needs (meds, wound care, etc)  - Arrange for interpreters to assist at discharge as needed  - Consider post-discharge preferences of patient/family/discharge partner  - Complete POLST form as appropriate  - Assess patient's ability to be responsible for managing their own health  - Refer to Case Management Department for coordinating discharge planning if the patient needs post-hospital services based on physician/LIP order or complex needs related to functional status, cognitive ability or social support system  Outcome: Progressing     Patient resting in bed. Vital signs stable. Safety measures and fall precautions in place, call light with in reach, bed locked in lowest position, bed alarm on. Frequent rounding by nursing staff.

## 2022-11-27 ENCOUNTER — APPOINTMENT (OUTPATIENT)
Dept: ULTRASOUND IMAGING | Facility: HOSPITAL | Age: 87
End: 2022-11-27
Attending: EMERGENCY MEDICINE
Payer: MEDICARE

## 2022-11-27 ENCOUNTER — HOSPITAL ENCOUNTER (EMERGENCY)
Facility: HOSPITAL | Age: 87
Discharge: HOME | End: 2022-11-27
Attending: EMERGENCY MEDICINE
Payer: MEDICARE

## 2022-11-27 ENCOUNTER — APPOINTMENT (OUTPATIENT)
Dept: ULTRASOUND IMAGING | Facility: HOSPITAL | Age: 87
End: 2022-11-27
Payer: MEDICARE

## 2022-11-27 VITALS
DIASTOLIC BLOOD PRESSURE: 56 MMHG | RESPIRATION RATE: 16 BRPM | TEMPERATURE: 98 F | SYSTOLIC BLOOD PRESSURE: 115 MMHG | OXYGEN SATURATION: 97 % | HEART RATE: 84 BPM

## 2022-11-27 DIAGNOSIS — L03.115 CELLULITIS OF RIGHT LOWER EXTREMITY: Primary | ICD-10-CM

## 2022-11-27 PROCEDURE — 99284 EMERGENCY DEPT VISIT MOD MDM: CPT

## 2022-11-27 PROCEDURE — 93971 EXTREMITY STUDY: CPT | Performed by: EMERGENCY MEDICINE

## 2022-11-27 RX ORDER — CEPHALEXIN 500 MG/1
500 CAPSULE ORAL 2 TIMES DAILY
Qty: 20 CAPSULE | Refills: 0 | Status: SHIPPED | OUTPATIENT
Start: 2022-11-27 | End: 2022-12-07

## 2022-11-27 RX ORDER — CEPHALEXIN 500 MG/1
500 CAPSULE ORAL ONCE
Status: COMPLETED | OUTPATIENT
Start: 2022-11-27 | End: 2022-11-27

## 2022-11-27 RX ORDER — CEPHALEXIN 500 MG/1
500 CAPSULE ORAL 2 TIMES DAILY
Qty: 20 CAPSULE | Refills: 0 | Status: SHIPPED | OUTPATIENT
Start: 2022-11-27 | End: 2022-11-27

## 2024-05-05 ENCOUNTER — HOSPITAL ENCOUNTER (EMERGENCY)
Facility: HOSPITAL | Age: 89
Discharge: HOME | End: 2024-05-05
Attending: STUDENT IN AN ORGANIZED HEALTH CARE EDUCATION/TRAINING PROGRAM
Payer: MEDICARE

## 2024-05-05 VITALS
DIASTOLIC BLOOD PRESSURE: 60 MMHG | OXYGEN SATURATION: 99 % | TEMPERATURE: 98 F | RESPIRATION RATE: 16 BRPM | SYSTOLIC BLOOD PRESSURE: 130 MMHG | HEART RATE: 67 BPM

## 2024-05-05 DIAGNOSIS — R19.7 DIARRHEA, UNSPECIFIED TYPE: Primary | ICD-10-CM

## 2024-05-05 LAB
ANION GAP SERPL CALC-SCNC: 6 MMOL/L (ref 0–18)
BASOPHILS # BLD AUTO: 0.02 X10(3) UL (ref 0–0.2)
BASOPHILS NFR BLD AUTO: 0.4 %
BUN BLD-MCNC: 29 MG/DL (ref 9–23)
BUN/CREAT SERPL: 16.8 (ref 10–20)
CALCIUM BLD-MCNC: 9.5 MG/DL (ref 8.7–10.4)
CHLORIDE SERPL-SCNC: 106 MMOL/L (ref 98–112)
CO2 SERPL-SCNC: 27 MMOL/L (ref 21–32)
CREAT BLD-MCNC: 1.73 MG/DL
DEPRECATED RDW RBC AUTO: 48.5 FL (ref 35.1–46.3)
EGFRCR SERPLBLD CKD-EPI 2021: 27 ML/MIN/1.73M2 (ref 60–?)
EOSINOPHIL # BLD AUTO: 0.04 X10(3) UL (ref 0–0.7)
EOSINOPHIL NFR BLD AUTO: 0.7 %
ERYTHROCYTE [DISTWIDTH] IN BLOOD BY AUTOMATED COUNT: 14.7 % (ref 11–15)
FLUAV + FLUBV RNA SPEC NAA+PROBE: NEGATIVE
FLUAV + FLUBV RNA SPEC NAA+PROBE: NEGATIVE
GLUCOSE BLD-MCNC: 117 MG/DL (ref 70–99)
HCT VFR BLD AUTO: 36.2 %
HGB BLD-MCNC: 11.2 G/DL
IMM GRANULOCYTES # BLD AUTO: 0.02 X10(3) UL (ref 0–1)
IMM GRANULOCYTES NFR BLD: 0.4 %
LYMPHOCYTES # BLD AUTO: 0.7 X10(3) UL (ref 1–4)
LYMPHOCYTES NFR BLD AUTO: 13.1 %
MCH RBC QN AUTO: 27.7 PG (ref 26–34)
MCHC RBC AUTO-ENTMCNC: 30.9 G/DL (ref 31–37)
MCV RBC AUTO: 89.6 FL
MONOCYTES # BLD AUTO: 0.47 X10(3) UL (ref 0.1–1)
MONOCYTES NFR BLD AUTO: 8.8 %
NEUTROPHILS # BLD AUTO: 4.09 X10 (3) UL (ref 1.5–7.7)
NEUTROPHILS # BLD AUTO: 4.09 X10(3) UL (ref 1.5–7.7)
NEUTROPHILS NFR BLD AUTO: 76.6 %
OSMOLALITY SERPL CALC.SUM OF ELEC: 295 MOSM/KG (ref 275–295)
PLATELET # BLD AUTO: 216 10(3)UL (ref 150–450)
POTASSIUM SERPL-SCNC: 4.5 MMOL/L (ref 3.5–5.1)
RBC # BLD AUTO: 4.04 X10(6)UL
RSV RNA SPEC NAA+PROBE: NEGATIVE
SARS-COV-2 RNA RESP QL NAA+PROBE: NOT DETECTED
SODIUM SERPL-SCNC: 139 MMOL/L (ref 136–145)
WBC # BLD AUTO: 5.3 X10(3) UL (ref 4–11)

## 2024-05-05 PROCEDURE — 99284 EMERGENCY DEPT VISIT MOD MDM: CPT

## 2024-05-05 PROCEDURE — 85025 COMPLETE CBC W/AUTO DIFF WBC: CPT | Performed by: STUDENT IN AN ORGANIZED HEALTH CARE EDUCATION/TRAINING PROGRAM

## 2024-05-05 PROCEDURE — 80048 BASIC METABOLIC PNL TOTAL CA: CPT | Performed by: STUDENT IN AN ORGANIZED HEALTH CARE EDUCATION/TRAINING PROGRAM

## 2024-05-05 PROCEDURE — 99283 EMERGENCY DEPT VISIT LOW MDM: CPT

## 2024-05-05 PROCEDURE — 96360 HYDRATION IV INFUSION INIT: CPT

## 2024-05-05 PROCEDURE — 0241U SARS-COV-2/FLU A AND B/RSV BY PCR (GENEXPERT): CPT | Performed by: STUDENT IN AN ORGANIZED HEALTH CARE EDUCATION/TRAINING PROGRAM

## 2024-05-05 NOTE — DISCHARGE INSTRUCTIONS
You can buy Imodium over-the-counter.  2 mg tablets or liquid to be taken 3 times a day as needed for diarrhea

## 2024-05-05 NOTE — ED QUICK NOTES
Discharge instructions reviewed with Loreta and her Son. Stool sample collection supplies provided with Rx for cultures. Encouraged to return to ED for any new or worsening symptoms.

## 2024-05-05 NOTE — ED INITIAL ASSESSMENT (HPI)
Patient arrives to ER for evaluation of intermittent diarrhea.     Patient currently had a wound infection in leg from swelling/edema.

## 2024-05-05 NOTE — ED QUICK NOTES
Loreta arrived through triage with her Son for c/o \"mushy\" stools since last Sunday (1 week ago today). 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 Reports chronic wounds to both legs, being followed by I.D. States she took 3 doses of Cipro back in March but discontinued due to intolerance (dizziness). Denies current use of antibiotics although Cephalexin is listed on her current med list.

## 2024-05-08 ENCOUNTER — APPOINTMENT (OUTPATIENT)
Dept: LAB | Facility: HOSPITAL | Age: 89
End: 2024-05-08
Attending: STUDENT IN AN ORGANIZED HEALTH CARE EDUCATION/TRAINING PROGRAM

## 2024-05-08 PROCEDURE — 87493 C DIFF AMPLIFIED PROBE: CPT

## 2024-05-09 ENCOUNTER — LAB ENCOUNTER (OUTPATIENT)
Dept: LAB | Facility: HOSPITAL | Age: 89
End: 2024-05-09
Attending: STUDENT IN AN ORGANIZED HEALTH CARE EDUCATION/TRAINING PROGRAM
Payer: MEDICARE

## 2024-05-09 DIAGNOSIS — R19.7 DIARRHEA, UNSPECIFIED TYPE: ICD-10-CM

## 2024-05-09 PROCEDURE — 87427 SHIGA-LIKE TOXIN AG IA: CPT

## 2024-05-09 PROCEDURE — 87045 FECES CULTURE AEROBIC BACT: CPT

## 2024-05-09 PROCEDURE — 87046 STOOL CULTR AEROBIC BACT EA: CPT

## 2024-05-10 ENCOUNTER — LAB ENCOUNTER (OUTPATIENT)
Dept: LAB | Facility: HOSPITAL | Age: 89
End: 2024-05-10
Attending: STUDENT IN AN ORGANIZED HEALTH CARE EDUCATION/TRAINING PROGRAM
Payer: MEDICARE

## 2024-05-10 DIAGNOSIS — R19.7 DIARRHEA, UNSPECIFIED TYPE: ICD-10-CM

## 2024-05-10 LAB — C DIFF TOX B STL QL: NEGATIVE

## 2024-06-12 ENCOUNTER — HOSPITAL ENCOUNTER (INPATIENT)
Facility: HOSPITAL | Age: 89
LOS: 3 days | Discharge: NURSING, REHAB OR LONG-TERM CARE | DRG: 603 | End: 2024-06-15
Attending: EMERGENCY MEDICINE | Admitting: HOSPITALIST

## 2024-06-12 DIAGNOSIS — L03.119 CELLULITIS OF LOWER EXTREMITY, UNSPECIFIED LATERALITY: Primary | ICD-10-CM

## 2024-06-12 PROBLEM — L03.90 CELLULITIS: Status: ACTIVE | Noted: 2024-06-12

## 2024-06-12 LAB
ANION GAP SERPL CALC-SCNC: 7 MMOL/L (ref 0–18)
BASOPHILS # BLD AUTO: 0.02 X10(3) UL (ref 0–0.2)
BASOPHILS NFR BLD AUTO: 0.3 %
BUN BLD-MCNC: 21 MG/DL (ref 9–23)
BUN/CREAT SERPL: 15.3 (ref 10–20)
CALCIUM BLD-MCNC: 9.2 MG/DL (ref 8.7–10.4)
CHLORIDE SERPL-SCNC: 109 MMOL/L (ref 98–112)
CO2 SERPL-SCNC: 26 MMOL/L (ref 21–32)
CREAT BLD-MCNC: 1.37 MG/DL
CRP SERPL-MCNC: 1.8 MG/DL (ref ?–1)
DEPRECATED RDW RBC AUTO: 47.2 FL (ref 35.1–46.3)
EGFRCR SERPLBLD CKD-EPI 2021: 36 ML/MIN/1.73M2 (ref 60–?)
EOSINOPHIL # BLD AUTO: 0.05 X10(3) UL (ref 0–0.7)
EOSINOPHIL NFR BLD AUTO: 0.8 %
ERYTHROCYTE [DISTWIDTH] IN BLOOD BY AUTOMATED COUNT: 15 % (ref 11–15)
ERYTHROCYTE [SEDIMENTATION RATE] IN BLOOD: 46 MM/HR
GLUCOSE BLD-MCNC: 138 MG/DL (ref 70–99)
HCT VFR BLD AUTO: 35.6 %
HGB BLD-MCNC: 11.1 G/DL
IMM GRANULOCYTES # BLD AUTO: 0.03 X10(3) UL (ref 0–1)
IMM GRANULOCYTES NFR BLD: 0.5 %
LYMPHOCYTES # BLD AUTO: 0.84 X10(3) UL (ref 1–4)
LYMPHOCYTES NFR BLD AUTO: 13.4 %
MCH RBC QN AUTO: 26.9 PG (ref 26–34)
MCHC RBC AUTO-ENTMCNC: 31.2 G/DL (ref 31–37)
MCV RBC AUTO: 86.4 FL
MONOCYTES # BLD AUTO: 0.5 X10(3) UL (ref 0.1–1)
MONOCYTES NFR BLD AUTO: 8 %
NEUTROPHILS # BLD AUTO: 4.82 X10 (3) UL (ref 1.5–7.7)
NEUTROPHILS # BLD AUTO: 4.82 X10(3) UL (ref 1.5–7.7)
NEUTROPHILS NFR BLD AUTO: 77 %
OSMOLALITY SERPL CALC.SUM OF ELEC: 299 MOSM/KG (ref 275–295)
PLATELET # BLD AUTO: 236 10(3)UL (ref 150–450)
POTASSIUM SERPL-SCNC: 4.4 MMOL/L (ref 3.5–5.1)
RBC # BLD AUTO: 4.12 X10(6)UL
SODIUM SERPL-SCNC: 142 MMOL/L (ref 136–145)
WBC # BLD AUTO: 6.3 X10(3) UL (ref 4–11)

## 2024-06-12 PROCEDURE — 99223 1ST HOSP IP/OBS HIGH 75: CPT | Performed by: HOSPITALIST

## 2024-06-12 RX ORDER — AMLODIPINE BESYLATE 5 MG/1
5 TABLET ORAL
Status: DISCONTINUED | OUTPATIENT
Start: 2024-06-12 | End: 2024-06-15

## 2024-06-12 RX ORDER — METOPROLOL SUCCINATE 25 MG/1
25 TABLET, EXTENDED RELEASE ORAL NIGHTLY
Status: DISCONTINUED | OUTPATIENT
Start: 2024-06-12 | End: 2024-06-15

## 2024-06-12 RX ORDER — HEPARIN SODIUM 5000 [USP'U]/ML
5000 INJECTION, SOLUTION INTRAVENOUS; SUBCUTANEOUS EVERY 12 HOURS SCHEDULED
Status: DISCONTINUED | OUTPATIENT
Start: 2024-06-12 | End: 2024-06-12

## 2024-06-12 RX ORDER — LISINOPRIL 20 MG/1
20 TABLET ORAL DAILY
Status: DISCONTINUED | OUTPATIENT
Start: 2024-06-13 | End: 2024-06-15

## 2024-06-12 RX ORDER — ATORVASTATIN CALCIUM 20 MG/1
20 TABLET, FILM COATED ORAL NIGHTLY
Status: DISCONTINUED | OUTPATIENT
Start: 2024-06-12 | End: 2024-06-15

## 2024-06-12 RX ORDER — METOCLOPRAMIDE HYDROCHLORIDE 5 MG/ML
10 INJECTION INTRAMUSCULAR; INTRAVENOUS EVERY 8 HOURS PRN
Status: DISCONTINUED | OUTPATIENT
Start: 2024-06-12 | End: 2024-06-15

## 2024-06-12 RX ORDER — LEVOTHYROXINE SODIUM 88 UG/1
88 TABLET ORAL
Status: DISCONTINUED | OUTPATIENT
Start: 2024-06-13 | End: 2024-06-15

## 2024-06-12 RX ORDER — ONDANSETRON 2 MG/ML
4 INJECTION INTRAMUSCULAR; INTRAVENOUS EVERY 6 HOURS PRN
Status: DISCONTINUED | OUTPATIENT
Start: 2024-06-12 | End: 2024-06-15

## 2024-06-12 RX ORDER — ALLOPURINOL 100 MG/1
100 TABLET ORAL DAILY
Status: DISCONTINUED | OUTPATIENT
Start: 2024-06-13 | End: 2024-06-15

## 2024-06-12 RX ORDER — ACETAMINOPHEN 500 MG
500 TABLET ORAL EVERY 4 HOURS PRN
Status: DISCONTINUED | OUTPATIENT
Start: 2024-06-12 | End: 2024-06-15

## 2024-06-12 RX ORDER — ASPIRIN 81 MG/1
81 TABLET ORAL DAILY
Status: DISCONTINUED | OUTPATIENT
Start: 2024-06-12 | End: 2024-06-15

## 2024-06-12 NOTE — CONSULTS
[facility]  part of [facility] Infectious Disease  Report of Consultation    Loreta Coy Patient Status:  Emergency    1929 MRN [id number]   Location [facility] EMERGENCY DEPARTMENT Attending Steve Randall MD   Hosp Day # 0 PCP Morteza Isabel MD     Date of Admission:  2024  Date of Consult:  2024    Reason for Consultation:  Pseudomonas cellulitis    History of Present Illness:  Loreta Coy is a a(n) 89 year old female being seen at your request regarding pseudomonas cellulitis.  Patient was seen in my office yesterday for the same.  Patient with chronic stasis dermatitis, fungal dermatitis, and excoriations to her b/l LEs.  These are managed with home wound care services.  Recently she had some interval worsening in the appearance of her wounds and cultures were obtained which isolated pseudomonas.  She was placed on a course of oral ciprofloxacin and developed dizziness on this Rx.  She was then advised to seek ID evaluation for IV Rx.    Patient was seen in my office yesterday with her daughter-in-law.  She has some very intermittent chills but no s/s of sepsis.  She has been using vinegar soaks for her wounds.  After discussion - given the complexities of arranging a PICC with the potential for multiple daily doses of antibiotics and wound care needs - we elected to refer to the hospital for admission and likely SNF placement.    IV cefepime ordered in the ED and we are asked to see and assist.    History:  Past Medical History:    Back pain    Essential hypertension    Gout    Hyperlipidemia    Thyroid disease     History reviewed. No pertinent surgical history.  History reviewed. No pertinent family history.   reports that she has never smoked. She has never used smokeless tobacco. She reports that she does not drink alcohol and does not use drugs.    Allergies:  Allergies   Allergen Reactions    Ciprofloxacin DIZZINESS        Medications:    Current Facility-Administered Medications:     ceFEPIme (Maxpime) 2 g in sodium chloride 0.9% 100 mL IVPB-MBP, 2 g, Intravenous, Once    Review of Systems:    Constitutional:  Chills.   HEENT:  No visual changes, oral ulcers, sore throat, difficulty swallowing.   Respiratory: Negative for cough, sputum, hemoptysis, chest pain, wheezing, dyspnea on exertion, or stridor.   Cardiovascular: Negative for chest pain, palpitations, irregular heart beats.   Gastrointestinal:  No abdominal pain, nausea, vomiting, diarrhea, or constipation.   Genitourinary:  No dysuria, hematuria, urine urgency or frequency.   Integument/breast: Negative for rash, skin lesions, and pruritus.   Hematologic/lymphatic: Negative for easy bruising, bleeding, and lymphadenopathy.   Musculoskeletal: Open wounds, excoriations to b/l LEs.   Neurological: No focal neurologic deficits, seizures, tremors.   Psych:  No h/o anxiety, depression, other psych d/o.   Endocrine: No history of of diabetes, thyroid disorder.    Remainder of 12 point review of systems otherwise negative.    Vital signs in last 24 hours:  Patient Vitals for the past 24 hrs:   BP Temp Temp src Pulse Resp SpO2 Height Weight   06/12/24 1021 132/64 97.9 °F (36.6 °C) Oral 82 20 99 % 5' (1.524 m) 137 lb (62.1 kg)       Intake/Output:  No intake/output data recorded.    Physical Exam:   General: Awake, alert, non-tox and in NAD.   Head: Normocephalic, without obvious abnormality, atraumatic.   Eyes: Conjunctivae/corneas clear.  No scleral icterus.  No conjunctival     hemorrhage.   Nose: Nares normal.   Throat:  Oropharynx clear, MMs moist.   Neck: Trachea ML, no masses.   Lungs: CTA b/l no rhonchi, rales, wheezes.   Chest wall: No tenderness or deformity.   Heart: Regular rate and rhythm, normal S1S2, no murmurs.   Abdomen: Soft, NT/ND.  Bowel sounds present.  No organomegaly.   Extremity: Edema, open wounds with excoriations to b/l LEs.   Skin: No rashes or  lesions.   Neurological: No focal neurologic deficits.    Cultures:   Outpatient cultures with pansensitive pseudomonas    Assessment and Plan:    Complicated pseudomonas cellulitis to the b/l LEs with worsening wounds, drainage, and excoriations in spite of outpatient wound nursing services  - Outpatient cultures with pansensitive pseudomonas  - Was not able to tolerate p.o. ciprofloxacin due to dizziness  - Will start IV cefepime    2.  H/o renal insufficiency  - Will renally dose antibiotics    3.  Disposition - inpatient.  Start IV cefepime.  Awaiting labs to assure it is ok to place PICC with renal insufficiency.  Will need wound care evaluation and  for likely ECF for 2-3 weeks of IV Rx.  Trending temps and WBCs.  Will follow.    Urmila Owen [facility] Infectious Disease  [phone]    6/12/2024  11:08 AM

## 2024-06-12 NOTE — PROGRESS NOTES
06/12/24 1315   Wound 06/12/24 Foot Right;Dorsal;Plantar   Date First Assessed: 06/12/24   Present on Original Admission: Yes  Primary Wound Type: Cellulitis  Location: Foot  Wound Location Orientation: Right;Dorsal;Plantar   Wound Image    Site Assessment Clean;Dry;Fragile;Excoriated;Painful;Red   Drainage Amount None   Treatments Cleansed;Topical (Barrier/Moisturizer/Ointment)   Dressing Aquacel Foam;Kerlix roll   Dressing Changed New   Non-staged Wound Description Partial thickness   Thao-wound Assessment Dry;Fragile   Wound Granulation Tissue Red   State of Healing Non-healing   Wound Odor None   Wound 06/12/24 Foot Left   Date First Assessed: 06/12/24   Present on Original Admission: Yes  Primary Wound Type: Cellulitis  Location: Foot  Wound Location Orientation: Left   Wound Image    Site Assessment Clean;Dry;Fragile;Excoriated;Painful;Red   Drainage Amount None   Treatments Cleansed;Topical (Barrier/Moisturizer/Ointment)   Dressing Aquacel Foam;Kerlix roll   Dressing Changed New   Non-staged Wound Description Partial thickness   Thao-wound Assessment Clean;Dry   Wound Granulation Tissue Red   State of Healing Non-healing   Wound Odor None   Wound Follow Up   Follow up needed Yes       WOUND CARE NOTE    History:  Past Medical History:    Back pain    Essential hypertension    Gout    Hyperlipidemia    Thyroid disease     History reviewed. No pertinent surgical history.   Social History     Socioeconomic History    Marital status:    Tobacco Use    Smoking status: Never    Smokeless tobacco: Never   Vaping Use    Vaping status: Never Used   Substance and Sexual Activity    Alcohol use: No    Drug use: No     Social Determinants of Health     Financial Resource Strain: Medium Risk (4/29/2024)    Received from [facility]    Overall Financial Resource Strain (CARDIA)     Difficulty of Paying Living Expenses: Somewhat hard   Food Insecurity: No Food Insecurity (6/12/2024)    Food  Insecurity     Food Insecurity: Never true   Transportation Needs: No Transportation Needs (6/12/2024)    Transportation Needs     Lack of Transportation: No   Housing Stability: Low Risk  (6/12/2024)    Housing Stability     Housing Instability: No       PLAN   Recommendations:  Dr. Owen currently on consult  Dietary consult for recommendations for nutrition to optimize wound healing  Heels elevated using pillows, heel wedge or heel boots to offload heels    Wound(s)  Location: bilateral legs/feet  Cleansing  Saline/hebi cleanse    Topical Vaseline  Dressings unbordered foams to raw areas  Secure with kerlix rolls  Frequency daily     OBJECTIVE   MD Consult new  Reason for Consult   BLE       ASSESSMENT   Tristan Score:  Tristan Scale Score: 17    Chart Reviewed: yes    Wound(s):  Pt seen with bedside RN and family present. Pt has home health and wound rn come to her home to assist with bliateral legs and feet. Pt states she has been \"using vinegar and vaseline at home\" (acetic acid most likely to cleanse). There is no drainage noted, socks were dry, skin is flaky, dry and there are some \"raw\" areas to the right dorsal and plantar foot near the toes as well as the medial left ankle/foot. BLE cleansed with hebicleanse, dried and Vasline applied. Unbordered foams were applied to raw areas in case of drainage and for protection, secured with kerlix rolls.        Allergies: Ciprofloxacin    Labs:   Lab Results   Component Value Date    WBC 6.3 06/12/2024    HGB 11.1 (L) 06/12/2024    HCT 35.6 06/12/2024    .0 06/12/2024    CREATSERUM 1.37 (H) 06/12/2024    BUN 21 06/12/2024     06/12/2024    K 4.4 06/12/2024     06/12/2024    CO2 26.0 06/12/2024     (H) 06/12/2024    CA 9.2 06/12/2024     No results found for: \"PREALBUMIN\"      Time Spent 30 Minutes.    Pau Casillas, RN, BSN, [facility] Wound Care  [facility]  [phone]

## 2024-06-12 NOTE — ED PROVIDER NOTES
Patient Seen in: [facility] 5sw/se      History     Chief Complaint   Patient presents with    Infection     Stated Complaint: leg swelling/redness; sent for leg infection    Subjective:   HPI        Objective:   Past Medical History:    Back pain    Essential hypertension    Gout    Hyperlipidemia    Thyroid disease              History reviewed. No pertinent surgical history.             Social History     Socioeconomic History    Marital status:    Tobacco Use    Smoking status: Never    Smokeless tobacco: Never   Vaping Use    Vaping status: Never Used   Substance and Sexual Activity    Alcohol use: No    Drug use: No     Social Determinants of Health     Financial Resource Strain: Medium Risk (4/29/2024)    Received from [facility]    Overall Financial Resource Strain (CARDIA)     Difficulty of Paying Living Expenses: Somewhat hard   Food Insecurity: No Food Insecurity (6/12/2024)    Food Insecurity     Food Insecurity: Never true   Transportation Needs: No Transportation Needs (6/12/2024)    Transportation Needs     Lack of Transportation: No   Housing Stability: Low Risk  (6/12/2024)    Housing Stability     Housing Instability: No              Review of Systems    Positive for stated complaint: leg swelling/redness; sent for leg infection  Other systems are as noted in HPI.  Constitutional and vital signs reviewed.      All other systems reviewed and negative except as noted above.    Physical Exam     ED Triage Vitals [06/12/24 1021]   /64   Pulse 82   Resp 20   Temp 97.9 °F (36.6 °C)   Temp src Oral   SpO2 99 %   O2 Device None (Room air)       Current Vitals:   Vital Signs  BP: 143/64  Pulse: 74  Resp: 16  Temp: 98 °F (36.7 °C)  Temp src: Oral  MAP (mmHg): 88    Oxygen Therapy  SpO2: 98 %  O2 Device: None (Room air)            Physical Exam          ED Course     Labs Reviewed   BASIC METABOLIC PANEL (8) - Abnormal; Notable for the following components:        Result Value    Glucose 138 (*)     Creatinine 1.37 (*)     Calculated Osmolality 299 (*)     eGFR-Cr 36 (*)     All other components within normal limits   SED RATE, WESTERGREN (AUTOMATED) - Abnormal; Notable for the following components:    Sed Rate 46 (*)     All other components within normal limits   C-REACTIVE PROTEIN - Abnormal; Notable for the following components:    C-Reactive Protein 1.80 (*)     All other components within normal limits   CBC W/ DIFFERENTIAL - Abnormal; Notable for the following components:    HGB 11.1 (*)     RDW-SD 47.2 (*)     Lymphocyte Absolute 0.84 (*)     All other components within normal limits   CBC WITH DIFFERENTIAL WITH PLATELET    Narrative:     The following orders were created for panel order CBC With Differential With Platelet.  Procedure                               Abnormality         Status                     ---------                               -----------         ------                     CBC W/ DIFFERENTIAL[[id number]]          Abnormal            Final result                 Please view results for these tests on the individual orders.   BLOOD CULTURE   BLOOD CULTURE                      MDM      89-year-old female with a history of bilateral lower extremity lymphedema who is being treated at home for bilateral lower extremity wounds presents today for treatment of wound infections.  Patient has had some worsening of the wounds despite treatment with wound care nursing and cultures recently positive for pansensitive Pseudomonas.  She was prescribed ciprofloxacin, but was having the side effect of dizziness.  Therefore, her infectious disease doctor recommended admission for PICC line placement and treatment with cefepime.  Patient denies increasing pain, fevers, purulence, or other new symptoms.    On exam, vitals normal, bilateral lower extremities undressed with clean wounds with some foul smell but no purulence or significant induration/erythema.  Chronic  lymphedema with weeping.    Differential: Pseudomonal skin infection of the bilateral lower extremities.    Baseline labs and inflammatory markers were drawn as well as blood cultures.  Patient given cefepime and admitted for further management.  Admission disposition: 6/12/2024 11:17 AM                                        Medical Decision Making      Disposition and Plan     Clinical Impression:  1. Cellulitis of lower extremity, unspecified laterality         Disposition:  Admit  6/12/2024 11:17 am    Follow-up:  No follow-up provider specified.  We recommend that you schedule follow up care with a primary care provider within the next three months to obtain basic health screening including reassessment of your blood pressure.      Medications Prescribed:  Current Discharge Medication List                            Hospital Problems       Present on Admission             ICD-10-CM Noted POA    * (Principal) Cellulitis of lower extremity, unspecified laterality L03.119 6/12/2024 Unknown    Cellulitis L03.90 6/12/2024 Unknown

## 2024-06-12 NOTE — ED QUICK NOTES
Orders for admission, patient is aware of plan and ready to go upstairs. Any questions, please call ED RN Demetra at extension [phone].     Patient Covid vaccination status: Unvaccinated     COVID Test Ordered in ED: None    COVID Suspicion at Admission: N/A    Running Infusions:  None    Mental Status/LOC at time of transport: AOx4    Other pertinent information:   CIWA score: N/A   NIH score:  N/A

## 2024-06-12 NOTE — CM/SW NOTE
06/12/24 1500   CM/SW Referral Data   Referral Source Social Work (self-referral)   Reason for Referral Discharge planning   Informant Patient   Medical Hx   Does patient have an established PCP? Yes   Patient Info   Patient's Current Mental Status at Time of Assessment Alert;Oriented   Patient's Home Environment House   Patient Status Prior to Admission   Independent with ADLs and Mobility Yes   Services in place prior to admission Home Health Care   Home Health Provider Info [facility]    Discharge Needs   Anticipated D/C needs Home health care;Infusion care;Subacute rehab   Services Requested   Submitted to [facility] Yes   PASRR Level 1 Submitted Yes     SW initiated self referral for discharge planning. Sw introduced self and role to patient and family member at bedside. Pt provided above information. Patient reports that she is current with [facility]  for HH RN and has wound md. Pt has no hx of [facility]. SW provided different options for LTC IV ABX- [facility], Home with HH RN, Infusion center. Patient wishes to think about the options and discuss with family. Patient expresses that she has no one at home to assist with self administering IV ABX. Patient is agreeable to SW sending [facility] referrals via aidin. [facility] request send. PASRR level 1 screen submitted and completed and uploaded to aidin referral. Patient needs 3 MN for [facility]. SW will follow up with list tomorrow.     Plan: Pending medical clearance, DC to [facility] - PASRR, *[facility], *list/choice, *3 MN Vs Home with [facility].     SW/CM to remain available for support and/or discharge planning.     Shavonne Johnson, MSW, LSW   x [phone]

## 2024-06-12 NOTE — H&P
[facility]    PATIENT'S NAME: ESCOBAR OLVERA   ATTENDING PHYSICIAN: Steve Randall MD   PATIENT ACCOUNT#:   [id number]    LOCATION:  [address]  MEDICAL RECORD #:   [id number]       YOB: 1929  ADMISSION DATE:       06/12/2024    HISTORY AND PHYSICAL EXAMINATION    CHIEF COMPLAINT:  Bilateral lower extremity cellulitis.    HISTORY OF PRESENT ILLNESS:  The patient is a 89-year-old  female who had chronic lymphedema and cellulitis with superficial wounds both legs and heels for the last 2 to 3 weeks.  She had outpatient cultures growing pansensitive Pseudomonas aeruginosa.  Started on oral ciprofloxacin, which she could not tolerate secondary to intense dizziness.  She was seen by Infectious Disease for consultation, Dr. Owen, yesterday, and today she was sent to the emergency department for evaluation to initiate IV cefepime.  The patient had CBC which was unremarkable.  Cefepime was initiated.  Chemistry, blood cultures, C-reactive protein, and sedimentation rate are still pending.    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    PAST SURGICAL HISTORY:  None.    MEDICATIONS:  Please see medication reconciliation list.    ALLERGIES:  No known drug allergies.     FAMILY HISTORY:  The patient is not able to recall any details.    SOCIAL HISTORY:  No tobacco, alcohol, or drug use.  Lives with her family.  Requires assistance in her basic activities of daily living.    REVIEW OF SYSTEMS:  The patient reports she had chronic lymphedema with superficial ulcerations around both heels and the dorsum of left foot and right foot with progressive erythema and oozing from superficial wounds in both feet, has been persistent for the last 2 to 3  weeks.  No fever or chills.  Other 12-point review of systems negative.      PHYSICAL EXAMINATION:    GENERAL:  Alert, oriented to time, place, and person, pleasant, no acute distress.  VITAL SIGNS:  Temperature 97.9, pulse 79, respiratory rate 16, blood pressure 128/70, pulse ox 98% on room air.  HEENT:  Atraumatic.  Oropharynx clear.  Moist mucous membranes.  Ears, nose normal.  Eyes:  Anicteric sclerae.   NECK:  Supple.  No lymphadenopathy.  Trachea midline.  Full range of motion.  LUNGS:  Clear to auscultation bilaterally.  Normal respiratory effort.   HEART:  Regular rate and rhythm.  S1 and S2 auscultated.  No murmur.  ABDOMEN:  Soft, nondistended.  No tenderness.  Positive bowel sounds.  EXTREMITIES:  Chronic lymphedema with erythema extending from midsection both legs toward bilateral feet with superficial ulceration noted on the lateral aspect of the left hindfoot and sloughing of the skin noted both ankles, knees, and forefeet bilaterally.  NEUROLOGIC:  Motor and sensory intact.    ASSESSMENT AND PLAN:    1.   Bilateral lower extremity cellulitis, superficial ulcerations, positive culture for pansensitive Pseudomonas aeruginosa.  2.   Chronic kidney disease stage 3.  3.   Hypertension.    The patient will be admitted to general medical floor.   IV cefepime.  Pain control.  Wound Service consult.  Infectious Disease consult.  Further recommendations to follow.    Dictated By Raffi Huber MD  d: 06/12/2024 11:28:34  t: 06/12/2024 11:30:55  Job [id number]  FB/    cc: Steve Randall MD

## 2024-06-12 NOTE — ED QUICK NOTES
Pt states her home health RN and ID doctor referred her to come in for BLE that is weeping. Infection is suspected and LT IV abx will be most likely needed. Pt denying fevers or pain. Daughter in law at the bedside.

## 2024-06-13 LAB
ANION GAP SERPL CALC-SCNC: 6 MMOL/L (ref 0–18)
BASOPHILS # BLD AUTO: 0.03 X10(3) UL (ref 0–0.2)
BASOPHILS NFR BLD AUTO: 0.6 %
BUN BLD-MCNC: 20 MG/DL (ref 9–23)
BUN/CREAT SERPL: 15.4 (ref 10–20)
CALCIUM BLD-MCNC: 8.3 MG/DL (ref 8.7–10.4)
CHLORIDE SERPL-SCNC: 110 MMOL/L (ref 98–112)
CO2 SERPL-SCNC: 24 MMOL/L (ref 21–32)
CREAT BLD-MCNC: 1.3 MG/DL
DEPRECATED RDW RBC AUTO: 45.9 FL (ref 35.1–46.3)
EGFRCR SERPLBLD CKD-EPI 2021: 38 ML/MIN/1.73M2 (ref 60–?)
EOSINOPHIL # BLD AUTO: 0.19 X10(3) UL (ref 0–0.7)
EOSINOPHIL NFR BLD AUTO: 3.8 %
ERYTHROCYTE [DISTWIDTH] IN BLOOD BY AUTOMATED COUNT: 14.8 % (ref 11–15)
GLUCOSE BLD-MCNC: 77 MG/DL (ref 70–99)
HCT VFR BLD AUTO: 31.9 %
HGB BLD-MCNC: 10.4 G/DL
IMM GRANULOCYTES # BLD AUTO: 0.02 X10(3) UL (ref 0–1)
IMM GRANULOCYTES NFR BLD: 0.4 %
LYMPHOCYTES # BLD AUTO: 0.94 X10(3) UL (ref 1–4)
LYMPHOCYTES NFR BLD AUTO: 18.9 %
MCH RBC QN AUTO: 27.7 PG (ref 26–34)
MCHC RBC AUTO-ENTMCNC: 32.6 G/DL (ref 31–37)
MCV RBC AUTO: 84.8 FL
MONOCYTES # BLD AUTO: 0.48 X10(3) UL (ref 0.1–1)
MONOCYTES NFR BLD AUTO: 9.7 %
NEUTROPHILS # BLD AUTO: 3.31 X10 (3) UL (ref 1.5–7.7)
NEUTROPHILS # BLD AUTO: 3.31 X10(3) UL (ref 1.5–7.7)
NEUTROPHILS NFR BLD AUTO: 66.6 %
OSMOLALITY SERPL CALC.SUM OF ELEC: 291 MOSM/KG (ref 275–295)
PLATELET # BLD AUTO: 186 10(3)UL (ref 150–450)
POTASSIUM SERPL-SCNC: 4.2 MMOL/L (ref 3.5–5.1)
RBC # BLD AUTO: 3.76 X10(6)UL
SODIUM SERPL-SCNC: 140 MMOL/L (ref 136–145)
WBC # BLD AUTO: 5 X10(3) UL (ref 4–11)

## 2024-06-13 PROCEDURE — 99233 SBSQ HOSP IP/OBS HIGH 50: CPT | Performed by: HOSPITALIST

## 2024-06-13 NOTE — PLAN OF CARE
Problem: Patient Centered Care  Goal: Patient preferences are identified and integrated in the patient's plan of care  Description: Interventions:  - What would you like us to know as we care for you? From home w/ grandsons  - Provide timely, complete, and accurate information to patient/family  - Incorporate patient and family knowledge, values, beliefs, and cultural backgrounds into the planning and delivery of care  - Encourage patient/family to participate in care and decision-making at the level they choose  - Honor patient and family perspectives and choices  Outcome: Progressing     Problem: Patient/Family Goals  Goal: Patient/Family Long Term Goal  Description: Patient's Long Term Goal: be discharged    Interventions:  -monitor VS  -monitor appropriate labs  -update patient and family on plan of care  -follow MD orders     - See additional Care Plan goals for specific interventions  Outcome: Progressing  Goal: Patient/Family Short Term Goal  Description: Patient's Short Term Goal: feel better    Interventions:   -monitor VS  -monitor appropriate labs  -update patient and family on plan of care  -discharge planning  -follow MD orders     - See additional Care Plan goals for specific interventions  Outcome: Progressing     Problem: RISK FOR INFECTION - ADULT  Goal: Absence of fever/infection during anticipated neutropenic period  Description: INTERVENTIONS  - Monitor WBC  - Administer growth factors as ordered  - Implement neutropenic guidelines  Outcome: Progressing     Problem: SAFETY ADULT - FALL  Goal: Free from fall injury  Description: INTERVENTIONS:  - Assess pt frequently for physical needs  - Identify cognitive and physical deficits and behaviors that affect risk of falls.  - [place] fall precautions as indicated by assessment.  - Educate pt/family on patient safety including physical limitations  - Instruct pt to call for assistance with activity based on assessment  - Modify environment to  reduce risk of injury  - Provide assistive devices as appropriate  - Consider OT/PT consult to assist with strengthening/mobility  - Encourage toileting schedule  Outcome: Progressing

## 2024-06-13 NOTE — PROGRESS NOTES
[facility]  part of [facility] Infectious Disease  Progress Note    Loreta Coy Patient Status:  Inpatient    1929 MRN [id number]   Location [facility] 5SW/SE Attending Mo Spain MD   Hosp Day # 1 PCP Morteza Isabel MD     Subjective:    Patient seen and examined resting in bed, she is tolerating the IV abx, Anxious regarding line placement otherwise no other complaints or concerns.     Review of Systems:  Review of systems reviewed and negative except as mentioned    Objective:  Blood pressure 117/58, pulse 62, temperature 98.7 °F (37.1 °C), temperature source Oral, resp. rate 16, height 5' (1.524 m), weight 132 lb 8 oz (60.1 kg), SpO2 96%.        Physical Exam:  General: Awake, alert, non-tox, NAD.  HEENT:  Oropharynx clear, trachea ML.  Heart: RRR S1S2 no murmurs.  Lungs: Essentially CTA b/l, no rhonchi, rales, wheezes.  Abdomen: Soft, NT/ND.  BS present.  No organomegaly.  Extremity: +ble with dressings in place  Neurological: No focal deficits.  Derm:  Warm, dry, free from rashes.            Lab Data Review:  Lab Results   Component Value Date    WBC 5.0 2024    HGB 10.4 2024    HCT 31.9 2024    .0 2024    CREATSERUM 1.30 2024    BUN 20 2024     2024    K 4.2 2024     2024    CO2 24.0 2024    GLU 77 2024    CA 8.3 2024    ESRML 46 2024    CRP 1.80 2024        Cultures:  No results found for this visit on 24.     Radiology:          Assessment and Plan:    Complicated pseudomonas cellulitis to the b/l LEs with worsening wounds, drainage, and excoriations in spite of outpatient wound nursing services  - Outpatient cultures with pansensitive pseudomonas  - Was not able to tolerate p.o. ciprofloxacin due to dizziness  - IV cefepime ongoing     H/o renal insufficiency  - Will renally dose antibiotics  - Discussed with Dr. Spain he is ok with  midline placement    Recommendations  - Continue IV cefepime as Rx, anticipate IV abx at dc for 2-3 weeks  - Midline ordered, placement pending, weekly labs while on IV abx   - local wound care ongoing per wound care team  - Pt/family deciding on SNF  - Repeat labs tomorrow   - Further recommendations to follow      If you have any questions or concerns please call [facility] Infectious Disease at [phone].     JOSEPH Allen    6/13/2024  8:09 AM   Pregnancy Pregnancy

## 2024-06-13 NOTE — PROGRESS NOTES
[facility]  Progress Note     Loreta Coy  : 1929    Status: Inpatient  Day #: 1    Attending: Mo Spain MD  PCP: Morteza Isabel MD     Assessment and Plan:    B/l LE cellulitis with ulcerations  -culture + pansensitive PSAR  -Adverse reaction to cipro outpatient  -cont cefepime IV per ID  -midline placement for outpatient IV abx    CKD stage 3-4  -stable. Monitor    HTN  -cont meds and monitor    HL  -cont statin    Hypothyroidism  -cont levothyroxine    H/o DVT  -on eliquis    Dispo:  pending. May need [facility].        DVT Mechanical Prophylaxis:        DVT Pharmacologic Prophylaxis   Medication    apixaban (Eliquis) tab 5 mg         DVT Pharmacologic prophylaxis: Aspirin 81 mg     Subjective:      Initial Chief Complaint:  b/l leg infection    Stable b/l LE pain. No CP, no SOB, no abd pain, no n/v.     10 point ROS completed and was negative, except for pertinent positive and negatives stated in subjective.      Objective:      Temp:  [98.3 °F (36.8 °C)-98.7 °F (37.1 °C)] 98.3 °F (36.8 °C)  Pulse:  [62-82] 82  Resp:  [16-18] 16  BP: (111-136)/(52-67) 130/56  SpO2:  [96 %-99 %] 98 %  General:  Alert, no distress  HEENT:  Normocephalic, atraumatic  Cardiac:  Regular rate, regular rhythm  Pulmonary:  Clear to auscultation bilaterally, respirations unlabored  Gastrointestinal:  Soft, non-tender, normal bowel sounds  Musculoskeletal:  No joint swelling  Extremities:  feet wrapped  Neurologic:  nonfocal  Psychiatric:  Normal affect, calm and appropriate    Intake/Output Summary (Last 24 hours) at 2024 1442  Last data filed at 2024 0902  Gross per 24 hour   Intake 670 ml   Output --   Net 670 ml         Recent Labs   Lab 24  1051 24  0543   WBC 6.3 5.0   HGB 11.1* 10.4*   HCT 35.6 31.9*   .0 186.0   RBC 4.12 3.76*   MCV 86.4 84.8   MCH 26.9 27.7   MCHC 31.2 32.6   RDW 15.0 14.8   NEPRELIM 4.82 3.31     Recent Labs   Lab 24  1051 24  0543   BUN 21 20    CREATSERUM 1.37* 1.30*   CA 9.2 8.3*    140   K 4.4 4.2    110   CO2 26.0 24.0   * 77   CRP 1.80*  --        No results found.      Medications:   miconazole   Topical BID    cefepime  1 g Intravenous Q24H    allopurinol  100 mg Oral Daily    amLODIPine  5 mg Oral Every afternoon at 1400    apixaban  5 mg Oral BID    aspirin  81 mg Oral Daily    atorvastatin  20 mg Oral Nightly    levothyroxine  88 mcg Oral Before breakfast    lisinopril  20 mg Oral Daily    metoprolol succinate ER  25 mg Oral Nightly      PRN Meds:   acetaminophen    ondansetron    metoclopramide    Supplementary Documentation:        MDM High. Time spent on chart/note review, review of labs/imaging, discussion with patient, physical exam, discussion with staff, consultants, coordinating care, writing progress note, discussion of plan of care.      Mo Spain MD

## 2024-06-13 NOTE — OCCUPATIONAL THERAPY NOTE
OCCUPATIONAL THERAPY EVALUATION - INPATIENT     Room Number: 572/572-A  Evaluation Date: 6/13/2024  Type of Evaluation: Initial  Presenting Problem: B LE cellulitis with infected wounds  Hx HTN, CKD3, renal insufficiency     Physician Order: IP Consult to Occupational Therapy  Reason for Therapy: ADL/IADL Dysfunction and Discharge Planning    OCCUPATIONAL THERAPY ASSESSMENT   Patient is a 89 year old female admitted 6/12/2024 for B LE cellulitis with infected wounds.  Prior to admission, patient's baseline is independent with I/ADLs, including laundry and cooking. MI for fx mobility using RW or SBQC in home d/t limited space to navigate with RW.  Patient is currently functioning near baseline with ADLs and fx mobility/transfers.  Patient is requiring up to mod assist for ADLs as a result of the following impairments: decreased functional strength, decreased functional reach, decreased endurance, pain, impaired balance, and decreased muscular endurance. Occupational Therapy will continue to follow for duration of hospitalization.    Patient will benefit from continued skilled OT Services at discharge to promote functional independence and safety with additional support and return home with home health OT    PLAN  OT Treatment Plan: Energy conservation/work simplification techniques;Balance activities;ADL training;Functional transfer training;Endurance training;Patient/Family education;Patient/Family training;Equipment eval/education;Compensatory technique education     OCCUPATIONAL THERAPY MEDICAL/SOCIAL HISTORY   Problem List  Principal Problem:    Cellulitis of lower extremity, unspecified laterality  Active Problems:    Cellulitis    HOME SITUATION  Type of Home: House  Home Layout: One level  Lives With: -- (2 grandsons)  Toilet and Equipment: Comfort height toilet  Shower/Tub and Equipment: Tub-shower combo; Tub transfer bench  Other Equipment: Reacher  Occupation/Status: Retired  Hand Dominance: Right  Drives:  No  Patient Regularly Uses: Hearing aides  Stairs in Home: 1 THIERRY  Use of Assistive Device(s): SBQC, RW    SUBJECTIVE  Patient agreeable to OT evaluation.    OCCUPATIONAL THERAPY EXAMINATION    OBJECTIVE  Precautions: Bed/chair alarm; Hard of hearing; Low vision (legally blind)  Fall Risk: Standard fall risk    PAIN ASSESSMENT  Rating: -- (not rated)  Location: B LE  Management Techniques: Activity promotion; Body mechanics; Repositioning; Nurse notified    ACTIVITY TOLERANCE  Pulse: 82  Heart Rate Source: Monitor     BP: 136/67  BP Location: Right arm  BP Method: Automatic  Patient Position: Sitting    O2 SATURATIONS  Oxygen Therapy  SPO2% Ambulation on Room Air: 98    COGNITION  Overall Cognitive Status:  WFL - within functional limits    VISION  Legally blind    SENSATION  Light touch:  intact    RANGE OF MOTION   Upper extremity ROM is within functional limits     STRENGTH ASSESSMENT  Upper extremity strength is within functional limits     ACTIVITIES OF DAILY LIVING ASSESSMENT  AM-PAC ‘6-Clicks’ Inpatient Daily Activity Short Form  How much help from another person does the patient currently need…  -   Putting on and taking off regular lower body clothing?: A Little  -   Bathing (including washing, rinsing, drying)?: A Little  -   Toileting, which includes using toilet, bedpan or urinal? : A Little  -   Putting on and taking off regular upper body clothing?: A Little  -   Taking care of personal grooming such as brushing teeth?: A Little  -   Eating meals?: None    AM-PAC Score:  Score: 19  Approx Degree of Impairment: 42.8%  Standardized Score (AM-PAC Scale): 40.22  CMS Modifier (G-Code): CK    BED MOBILITY  Not Tested - patient in bathroom upon therapist arrival     FUNCTIONAL TRANSFER ASSESSMENT  Sit to Stand from Toilet: Min assist  Chair Transfer: CGA    FUNCTIONAL MOBILITY  CGA for in-room fx mobility using RW    FUNCTIONAL ADL ASSESSMENT  Eating: setup assist (per obs)   Grooming: contact guard assist  standing at sink  UB Dressing: setup assist  LB Dressing: mod assist  Toileting: min assist    EDUCATION PROVIDED  Patient: Role of Occupational Therapy; Discharge Recommendations; Plan of Care; Functional Transfer Techniques; Fall Prevention; Posture/Positioning; Energy Conservation; Proper Body Mechanics  Patient's Response to Education: Verbalized Understanding; Returned Demonstration    The patient's Approx Degree of Impairment: 42.8% has been calculated based on documentation in the [facility] '6 clicks' Inpatient Daily Activity Short Form.  Research supports that patients with this level of impairment may benefit from  OT.  Final disposition will be made by interdisciplinary medical team.     Patient End of Session: Up in chair;Needs met;Call light within reach;RN aware of session/findings;All patient questions and concerns addressed;Alarm set    OT Goals  Patients self stated goal is: none stated     Patient will complete functional transfer with SUP  Comment:     Patient will complete toileting with SUP  Comment:     Patient will complete LB dresisng with SUP  Comment:    Patient will complete item retrieval with SUP  Comment:          Goals  on: 24  Frequency: 3-5x/week    Patient Evaluation Complexity Level:   Occupational Profile/Medical History MODERATE - Expanded review of history including review of medical or therapy record   Specific performance deficits impacting engagement in ADL/IADL MODERATE  3 - 5 performance deficits   Client Assessment/Performance Deficits MODERATE - Comorbidities and min to mod modifications of tasks    Clinical Decision Making MODERATE - Analysis of occupational profile, detailed assessments, several treatment options    Overall Complexity MODERATE     OT Session Time  Self-Care Home Management: 10 minutes    SEMAJ Vasquez/L  [facility]  #[id number]

## 2024-06-13 NOTE — PHYSICAL THERAPY NOTE
PHYSICAL THERAPY EVALUATION - INPATIENT     Room Number: 572/572-A  Evaluation Date: 6/13/2024  Type of Evaluation: Initial   Physician Order: PT Eval and Treat    Presenting Problem: cellulitis of lower extremity  Co-Morbidities : Hx HTN, CKD3, renal insufficiency  Reason for Therapy: Mobility Dysfunction and Discharge Planning    PHYSICAL THERAPY ASSESSMENT   Patient is a 89 year old female admitted 6/12/2024 for cellulitis of lower extremity. Prior to admission, patient's baseline is Mod I > Independent for ADLs/functional mobility with use of cane and rolling walker. Patient is currently functioning below baseline with bed mobility, transfers, gait, stair negotiation, standing prolonged periods, and performing household tasks.  Patient is requiring contact guard assist and minimal assist as a result of the following impairments: decreased functional strength, decreased endurance/aerobic capacity, pain, impaired standing balance, decreased muscular endurance, and medical status.  Physical Therapy will continue to follow for duration of hospitalization.    Patient will benefit from continued skilled PT Services at discharge to promote functional independence and safety with additional support and return home with home health PT.    PLAN  PT Treatment Plan: Bed mobility;Body mechanics;Coordination;Endurance;Energy conservation;Patient education;Gait training;Strengthening;Transfer training;Balance training  Rehab Potential : Good  Frequency (Obs): 5x/week    PHYSICAL THERAPY MEDICAL/SOCIAL HISTORY     Problem List  Principal Problem:    Cellulitis of lower extremity, unspecified laterality  Active Problems:    Cellulitis      HOME SITUATION  Home Situation  Type of Home: House  Home Layout: One level  Stairs to Enter : 2 (1 + 1)  Railing: No  Lives With:  (2 grandsons)  Drives: No  Patient Owned Equipment: Rolling walker;Cane  Patient Regularly Uses: Hearing aides     SUBJECTIVE  \"I can't drive, I have low  vision\"    PHYSICAL THERAPY EXAMINATION   OBJECTIVE  Precautions: Bed/chair alarm;Hard of hearing;Low vision (legally blind)  Fall Risk: Standard fall risk    WEIGHT BEARING RESTRICTION  Weight Bearing Restriction: None    PAIN ASSESSMENT  Rating: Unable to rate  Location: [facility], L > R  Management Techniques: Activity promotion;Body mechanics;Repositioning    COGNITION  Overall Cognitive Status:  WFL - within functional limits    RANGE OF MOTION AND STRENGTH ASSESSMENT  Upper extremity ROM and strength are within functional limits   Lower extremity ROM is within functional limits   Lower extremity strength is within functional limits     BALANCE  Static Sitting: Good  Dynamic Sitting: Fair +  Static Standing: Fair  Dynamic Standing: Fair -    ACTIVITY TOLERANCE  Pulse: 82  Heart Rate Source: Monitor     BP: 136/67  BP Location: Right arm  BP Method: Automatic  Patient Position: Sitting    O2 WALK  Oxygen Therapy  SPO2% Ambulation on Room Air: 98    AM-PAC '6-Clicks' INPATIENT SHORT FORM - BASIC MOBILITY  How much difficulty does the patient currently have...  Patient Difficulty: Turning over in bed (including adjusting bedclothes, sheets and blankets)?: A Little   Patient Difficulty: Sitting down on and standing up from a chair with arms (e.g., wheelchair, bedside commode, etc.): A Little   Patient Difficulty: Moving from lying on back to sitting on the side of the bed?: A Little   How much help from another person does the patient currently need...   Help from Another: Moving to and from a bed to a chair (including a wheelchair)?: A Little   Help from Another: Need to walk in hospital room?: A Little   Help from Another: Climbing 3-5 steps with a railing?: A Lot     AM-PAC Score:  Raw Score: 17   Approx Degree of Impairment: 50.57%   Standardized Score (AM-PAC Scale): 42.13   CMS Modifier (G-Code): CK    FUNCTIONAL ABILITY STATUS  Functional Mobility/Gait Assessment  Gait Assistance: Contact guard assist  Distance  (ft): 8 ft and 20 ft and 5 ft  Assistive Device: Rolling walker;Cane  Pattern: Shuffle (decreased pavel speed, decreased step length, flexed posture. Patient utilizing cane for short distance ambulation to bedside chair. Patient prefers to carry her cane with her at all times, even when utilizing the RW.)  Sit to Stand: minimal assist from toilet    Exercise/Education Provided:  Bed mobility  Body mechanics  Energy conservation  Functional activity tolerated  Gait training  Posture  Transfer training    The patient's Approx Degree of Impairment: 50.57% has been calculated based on documentation in the [facility] '6 clicks' Inpatient Basic Mobility Short Form.  Research supports that patients with this level of impairment may benefit from HHPT.  Final disposition will be made by interdisciplinary medical team.    Patient in bathroom upon arrival. RN approved activity. Educated patient on POC and benefits of mobilization. Agreeable to participate. Patient reporting BLE (L > R) pain, not quantified per the pain scale.  Patient End of Session: Up in chair;Needs met;Call light within reach;RN aware of session/findings;All patient questions and concerns addressed    CURRENT GOALS  Goals to be met by: 6/20/24  Patient Goal Patient's self-stated goal is: go home   Goal #1 Patient is able to demonstrate supine - sit EOB @ level: supervision     Goal #1   Current Status    Goal #2 Patient is able to demonstrate transfers Sit to/from Stand at assistance level: supervision with walker - rolling     Goal #2  Current Status    Goal #3 Patient is able to ambulate 75 feet with assist device: walker - rolling at assistance level: supervision   Goal #3   Current Status    Goal #4 Patient will negotiate 2 stairs/one curb w/ assistive device and supervision   Goal #4   Current Status    Goal #5 Patient to demonstrate independence with home activity/exercise instructions provided to patient in preparation for discharge.   Goal #5   Current  Status      Patient Evaluation Complexity Level:  History Moderate - 1 or 2 personal factors and/or co-morbidities   Examination of body systems Moderate - addressing a total of 3 or more elements   Clinical Presentation Low- Stable   Clinical Decision Making  Low Complexity     Therapeutic Activity:  10 minutes

## 2024-06-13 NOTE — CM/SW NOTE
Pt discussed in RN DC rounds. SW provided patient [facility] list. Patient wishes for [facility]. SW informed that patient has bed avail at the time that the list given. Patient wishes to review list with family. SW sent updated PT OT notes via aidin.     313pm- SW was informed that PP no longer has a bed avail. Patient updated. Patient is being fixated on PP. SW informed patient that she will need an alternative choice. Assigned CM/SW will follow up tomorrow. Pt is aware. Patient requested SW NOT to call family.     Plan: Pending medical clearance, DC to [facility], PASRR completed, *choice, *2 MN     SW/CM to remain available for support and/or discharge planning.     Shavonne Johnson, MSW, LSW   x [phone]

## 2024-06-13 NOTE — PLAN OF CARE
Problem: Patient Centered Care  Goal: Patient preferences are identified and integrated in the patient's plan of care  Description: Interventions:  - What would you like us to know as we care for you? From home w/ grandsons  - Provide timely, complete, and accurate information to patient/family  - Incorporate patient and family knowledge, values, beliefs, and cultural backgrounds into the planning and delivery of care  - Encourage patient/family to participate in care and decision-making at the level they choose  - Honor patient and family perspectives and choices  Outcome: Progressing     Problem: Patient/Family Goals  Goal: Patient/Family Long Term Goal  Description: Patient's Long Term Goal: be discharged    Interventions:  -monitor VS  -monitor appropriate labs  -update patient and family on plan of care  -follow MD orders     - See additional Care Plan goals for specific interventions  Outcome: Progressing  Goal: Patient/Family Short Term Goal  Description: Patient's Short Term Goal: feel better    Interventions:   -monitor VS  -monitor appropriate labs  -update patient and family on plan of care  -discharge planning  -follow MD orders     - See additional Care Plan goals for specific interventions  Outcome: Progressing     Problem: RISK FOR INFECTION - ADULT  Goal: Absence of fever/infection during anticipated neutropenic period  Description: INTERVENTIONS  - Monitor WBC  - Administer growth factors as ordered  - Implement neutropenic guidelines  Outcome: Progressing     Problem: SAFETY ADULT - FALL  Goal: Free from fall injury  Description: INTERVENTIONS:  - Assess pt frequently for physical needs  - Identify cognitive and physical deficits and behaviors that affect risk of falls.  - [place] fall precautions as indicated by assessment.  - Educate pt/family on patient safety including physical limitations  - Instruct pt to call for assistance with activity based on assessment  - Modify environment to  reduce risk of injury  - Provide assistive devices as appropriate  - Consider OT/PT consult to assist with strengthening/mobility  - Encourage toileting schedule  Outcome: Progressing     Problem: DISCHARGE PLANNING  Goal: Discharge to home or other facility with appropriate resources  Description: INTERVENTIONS:  - Identify barriers to discharge w/pt and caregiver  - Include patient/family/discharge partner in discharge planning  - Arrange for needed discharge resources and transportation as appropriate  - Identify discharge learning needs (meds, wound care, etc)  - Arrange for interpreters to assist at discharge as needed  - Consider post-discharge preferences of patient/family/discharge partner  - Complete POLST form as appropriate  - Assess patient's ability to be responsible for managing their own health  - Refer to Case Management Department for coordinating discharge planning if the patient needs post-hospital services based on physician/LIP order or complex needs related to functional status, cognitive ability or social support system  Outcome: Progressing     Problem: SKIN/TISSUE INTEGRITY - ADULT  Goal: Incision(s), wounds(s) or drain site(s) healing without S/S of infection  Description: INTERVENTIONS:  - Assess and document risk factors for pressure ulcer development  - Assess and document skin integrity  - Assess and document dressing/incision, wound bed, drain sites and surrounding tissue  - Implement wound care per orders  - Initiate isolation precautions as appropriate  - Initiate Pressure Ulcer prevention bundle as indicated  Outcome: Progressing     Problem: MUSCULOSKELETAL - ADULT  Goal: Return mobility to safest level of function  Description: INTERVENTIONS:  - Assess patient stability and activity tolerance for standing, transferring and ambulating w/ or w/o assistive devices  - Assist with transfers and ambulation using safe patient handling equipment as needed  - Ensure adequate protection for  wounds/incisions during mobilization  - Obtain PT/OT consults as needed  - Advance activity as appropriate  - Communicate ordered activity level and limitations with patient/family  Outcome: Progressing

## 2024-06-13 NOTE — PLAN OF CARE
No acute changes. Patient resting comfortably, call light within reach. Patient calls appropriately.    Problem: Patient Centered Care  Goal: Patient preferences are identified and integrated in the patient's plan of care  Description: Interventions:  - What would you like us to know as we care for you? From home w/ grandsons  - Provide timely, complete, and accurate information to patient/family  - Incorporate patient and family knowledge, values, beliefs, and cultural backgrounds into the planning and delivery of care  - Encourage patient/family to participate in care and decision-making at the level they choose  - Honor patient and family perspectives and choices  Outcome: Progressing     Problem: Patient/Family Goals  Goal: Patient/Family Long Term Goal  Description: Patient's Long Term Goal: be discharged    Interventions:  -monitor VS  -monitor appropriate labs  -update patient and family on plan of care  -follow MD orders     - See additional Care Plan goals for specific interventions  Outcome: Progressing  Goal: Patient/Family Short Term Goal  Description: Patient's Short Term Goal: feel better    Interventions:   -monitor VS  -monitor appropriate labs  -update patient and family on plan of care  -discharge planning  -follow MD orders     - See additional Care Plan goals for specific interventions  Outcome: Progressing     Problem: RISK FOR INFECTION - ADULT  Goal: Absence of fever/infection during anticipated neutropenic period  Description: INTERVENTIONS  - Monitor WBC  - Administer growth factors as ordered  - Implement neutropenic guidelines  Outcome: Progressing     Problem: SAFETY ADULT - FALL  Goal: Free from fall injury  Description: INTERVENTIONS:  - Assess pt frequently for physical needs  - Identify cognitive and physical deficits and behaviors that affect risk of falls.  - [place] fall precautions as indicated by assessment.  - Educate pt/family on patient safety including physical  limitations  - Instruct pt to call for assistance with activity based on assessment  - Modify environment to reduce risk of injury  - Provide assistive devices as appropriate  - Consider OT/PT consult to assist with strengthening/mobility  - Encourage toileting schedule  Outcome: Progressing     Problem: DISCHARGE PLANNING  Goal: Discharge to home or other facility with appropriate resources  Description: INTERVENTIONS:  - Identify barriers to discharge w/pt and caregiver  - Include patient/family/discharge partner in discharge planning  - Arrange for needed discharge resources and transportation as appropriate  - Identify discharge learning needs (meds, wound care, etc)  - Arrange for interpreters to assist at discharge as needed  - Consider post-discharge preferences of patient/family/discharge partner  - Complete POLST form as appropriate  - Assess patient's ability to be responsible for managing their own health  - Refer to Case Management Department for coordinating discharge planning if the patient needs post-hospital services based on physician/LIP order or complex needs related to functional status, cognitive ability or social support system  Outcome: Progressing     Problem: SKIN/TISSUE INTEGRITY - ADULT  Goal: Incision(s), wounds(s) or drain site(s) healing without S/S of infection  Description: INTERVENTIONS:  - Assess and document risk factors for pressure ulcer development  - Assess and document skin integrity  - Assess and document dressing/incision, wound bed, drain sites and surrounding tissue  - Implement wound care per orders  - Initiate isolation precautions as appropriate  - Initiate Pressure Ulcer prevention bundle as indicated  Outcome: Progressing     Problem: MUSCULOSKELETAL - ADULT  Goal: Return mobility to safest level of function  Description: INTERVENTIONS:  - Assess patient stability and activity tolerance for standing, transferring and ambulating w/ or w/o assistive devices  - Assist  with transfers and ambulation using safe patient handling equipment as needed  - Ensure adequate protection for wounds/incisions during mobilization  - Obtain PT/OT consults as needed  - Advance activity as appropriate  - Communicate ordered activity level and limitations with patient/family  Outcome: Progressing

## 2024-06-13 NOTE — DIETARY NOTE
Brief Nutrition Note:    Pt admitted for cellulitis of lower extremity. Pt screened at nutrition risk at admission by RN for unintentional weight loss and decreased appetite (MST 2). Chart reviewed, pt admitted for BLE wound infections (Cellulitis). Discussion with RN, no concerns - eating well. Intakes reviewed 80Pt visited, grandson at bedside. Pt reports no changes in appetite or intake - typically eats small breakfast, 1 large meal and snacks throughout the day. Denies use of oral nutritional supplements (ONS) prior to admission (PTA) - dislikes them. Pt reports weight loss over last 2 years but unsure of usual body weight (UBW). Pt reports having home health come for lymphedema to lower extremities. Current weight 132# 8 oz. EMR weight review utilizing care everywhere, last known weight 136# 2 oz on 5/9/24 - 3.6# or 2.7% weight loss x 1 month (non-significant). Per EMR weight review, pt noted weighing 137# on 4/29/24 - 4.5# or 3.3% weight loss x 2 months. Otherwise other weights from 2022 at 168#. Nutrition focused physical exam (NFPE) completed, age appropriate deficits noted. Pt appears well-nourished. Encouraged adequate energy and protein intake. Pt appears at no nutrition risk at this time. F/u at length of stay (LOS) per protocol. Please consult nutrition services if earlier intervention is indicated.    Wt Readings from Last 6 Encounters:   06/12/24 60.1 kg (132 lb 8 oz)   03/23/22 76.2 kg (168 lb)   02/11/22 75.3 kg (166 lb)   09/01/18 86.9 kg (191 lb 8 oz)     Patient Weight(s) for the past 336 hrs:   Weight   06/12/24 1557 60.1 kg (132 lb 8 oz)   06/12/24 1021 62.1 kg (137 lb)     Percent Meals Eaten (last 6 days)       Date/Time Percent Meals Eaten (%)    06/12/24 2000 100 %    06/13/24 0902 80 %          Jennifer Miles MS, ELIO, RDN, LDN  Clinical Dietitian  P: [phone]

## 2024-06-14 ENCOUNTER — APPOINTMENT (OUTPATIENT)
Dept: PICC SERVICES | Facility: HOSPITAL | Age: 89
DRG: 603 | End: 2024-06-14
Attending: NURSE PRACTITIONER

## 2024-06-14 LAB
ALBUMIN SERPL-MCNC: 3.1 G/DL (ref 3.2–4.8)
ALBUMIN/GLOB SERPL: 1.1 {RATIO} (ref 1–2)
ALP LIVER SERPL-CCNC: 66 U/L
ALT SERPL-CCNC: <7 U/L
ANION GAP SERPL CALC-SCNC: 5 MMOL/L (ref 0–18)
AST SERPL-CCNC: 10 U/L (ref ?–34)
BASOPHILS # BLD AUTO: 0.02 X10(3) UL (ref 0–0.2)
BASOPHILS NFR BLD AUTO: 0.5 %
BILIRUB SERPL-MCNC: 0.5 MG/DL (ref 0.2–0.9)
BUN BLD-MCNC: 21 MG/DL (ref 9–23)
BUN/CREAT SERPL: 16.3 (ref 10–20)
CALCIUM BLD-MCNC: 8.5 MG/DL (ref 8.7–10.4)
CHLORIDE SERPL-SCNC: 110 MMOL/L (ref 98–112)
CO2 SERPL-SCNC: 26 MMOL/L (ref 21–32)
CREAT BLD-MCNC: 1.29 MG/DL
DEPRECATED RDW RBC AUTO: 45.3 FL (ref 35.1–46.3)
EGFRCR SERPLBLD CKD-EPI 2021: 38 ML/MIN/1.73M2 (ref 60–?)
EOSINOPHIL # BLD AUTO: 0.28 X10(3) UL (ref 0–0.7)
EOSINOPHIL NFR BLD AUTO: 6.4 %
ERYTHROCYTE [DISTWIDTH] IN BLOOD BY AUTOMATED COUNT: 14.7 % (ref 11–15)
GLOBULIN PLAS-MCNC: 2.7 G/DL (ref 2–3.5)
GLUCOSE BLD-MCNC: 78 MG/DL (ref 70–99)
HCT VFR BLD AUTO: 31.5 %
HGB BLD-MCNC: 10.3 G/DL
IMM GRANULOCYTES # BLD AUTO: 0.01 X10(3) UL (ref 0–1)
IMM GRANULOCYTES NFR BLD: 0.2 %
LYMPHOCYTES # BLD AUTO: 0.82 X10(3) UL (ref 1–4)
LYMPHOCYTES NFR BLD AUTO: 18.8 %
MCH RBC QN AUTO: 27.6 PG (ref 26–34)
MCHC RBC AUTO-ENTMCNC: 32.7 G/DL (ref 31–37)
MCV RBC AUTO: 84.5 FL
MONOCYTES # BLD AUTO: 0.47 X10(3) UL (ref 0.1–1)
MONOCYTES NFR BLD AUTO: 10.8 %
NEUTROPHILS # BLD AUTO: 2.76 X10 (3) UL (ref 1.5–7.7)
NEUTROPHILS # BLD AUTO: 2.76 X10(3) UL (ref 1.5–7.7)
NEUTROPHILS NFR BLD AUTO: 63.3 %
OSMOLALITY SERPL CALC.SUM OF ELEC: 294 MOSM/KG (ref 275–295)
PLATELET # BLD AUTO: 194 10(3)UL (ref 150–450)
POTASSIUM SERPL-SCNC: 4.2 MMOL/L (ref 3.5–5.1)
PROT SERPL-MCNC: 5.8 G/DL (ref 5.7–8.2)
RBC # BLD AUTO: 3.73 X10(6)UL
SODIUM SERPL-SCNC: 141 MMOL/L (ref 136–145)
WBC # BLD AUTO: 4.4 X10(3) UL (ref 4–11)

## 2024-06-14 PROCEDURE — B546ZZA ULTRASONOGRAPHY OF RIGHT SUBCLAVIAN VEIN, GUIDANCE: ICD-10-PCS | Performed by: HOSPITALIST

## 2024-06-14 PROCEDURE — 99233 SBSQ HOSP IP/OBS HIGH 50: CPT | Performed by: HOSPITALIST

## 2024-06-14 PROCEDURE — 05H533Z INSERTION OF INFUSION DEVICE INTO RIGHT SUBCLAVIAN VEIN, PERCUTANEOUS APPROACH: ICD-10-PCS | Performed by: HOSPITALIST

## 2024-06-14 RX ORDER — CEFEPIME 1 G/50ML
1 INJECTION, SOLUTION INTRAVENOUS EVERY 24 HOURS
Qty: 700 ML | Refills: 0 | Status: SHIPPED | OUTPATIENT
Start: 2024-06-14 | End: 2024-06-28

## 2024-06-14 NOTE — PLAN OF CARE
Patient A/Ox4. Room air. Eliquis for DVT prophylactics . Bilateral lower dressing changed. Cardiac diet tolerated well. PICC line placed today . Plan [place] for KAVITHA pending when medically cleared. Call light and safety precautions in place.     Centered Care  Goal: Patient preferences are identified and integrated in the patient's plan of care  Description: Interventions:  - What would you like us to know as we care for you? From home w/ grandsons  - Provide timely, complete, and accurate information to patient/family  - Incorporate patient and family knowledge, values, beliefs, and cultural backgrounds into the planning and delivery of care  - Encourage patient/family to participate in care and decision-making at the level they choose  - Honor patient and family perspectives and choices  Outcome: Progressing

## 2024-06-14 NOTE — CONGREGATE LIVING REVIEW
[facility] Living Authorization    The [facility] Review Committee has reviewed this case and the patient IS APPROVED for discharge to a facility for Short Term Skilled once the following procedure is followed:     - The physician discharge instructions (contained within the GEORGETTE note for SNF) must inlcude the below appropriate and approved COVID instructions to the facility    For questions regarding CLRC approval process, please contact the CM assigned to the case.  For questions regarding RN discharge workflow, please contact the unit Clinical Leader.

## 2024-06-14 NOTE — PLAN OF CARE
Problem: Patient Centered Care  Goal: Patient preferences are identified and integrated in the patient's plan of care  Description: Interventions:  - What would you like us to know as we care for you? From home w/ grandsons  - Provide timely, complete, and accurate information to patient/family  - Incorporate patient and family knowledge, values, beliefs, and cultural backgrounds into the planning and delivery of care  - Encourage patient/family to participate in care and decision-making at the level they choose  - Honor patient and family perspectives and choices  Outcome: Progressing     Problem: Patient/Family Goals  Goal: Patient/Family Long Term Goal  Description: Patient's Long Term Goal: be discharged    Interventions:  -monitor VS  -monitor appropriate labs  -update patient and family on plan of care  -follow MD orders     - See additional Care Plan goals for specific interventions  Outcome: Progressing  Goal: Patient/Family Short Term Goal  Description: Patient's Short Term Goal: feel better    Interventions:   -monitor VS  -monitor appropriate labs  -update patient and family on plan of care  -discharge planning  -follow MD orders     - See additional Care Plan goals for specific interventions  Outcome: Progressing     Problem: RISK FOR INFECTION - ADULT  Goal: Absence of fever/infection during anticipated neutropenic period  Description: INTERVENTIONS  - Monitor WBC  - Administer growth factors as ordered  - Implement neutropenic guidelines  Outcome: Progressing     Problem: SAFETY ADULT - FALL  Goal: Free from fall injury  Description: INTERVENTIONS:  - Assess pt frequently for physical needs  - Identify cognitive and physical deficits and behaviors that affect risk of falls.  - [place] fall precautions as indicated by assessment.  - Educate pt/family on patient safety including physical limitations  - Instruct pt to call for assistance with activity based on assessment  - Modify environment to  reduce risk of injury  - Provide assistive devices as appropriate  - Consider OT/PT consult to assist with strengthening/mobility  - Encourage toileting schedule  Outcome: Progressing     Problem: DISCHARGE PLANNING  Goal: Discharge to home or other facility with appropriate resources  Description: INTERVENTIONS:  - Identify barriers to discharge w/pt and caregiver  - Include patient/family/discharge partner in discharge planning  - Arrange for needed discharge resources and transportation as appropriate  - Identify discharge learning needs (meds, wound care, etc)  - Arrange for interpreters to assist at discharge as needed  - Consider post-discharge preferences of patient/family/discharge partner  - Complete POLST form as appropriate  - Assess patient's ability to be responsible for managing their own health  - Refer to Case Management Department for coordinating discharge planning if the patient needs post-hospital services based on physician/LIP order or complex needs related to functional status, cognitive ability or social support system  Outcome: Progressing     Problem: SKIN/TISSUE INTEGRITY - ADULT  Goal: Incision(s), wounds(s) or drain site(s) healing without S/S of infection  Description: INTERVENTIONS:  - Assess and document risk factors for pressure ulcer development  - Assess and document skin integrity  - Assess and document dressing/incision, wound bed, drain sites and surrounding tissue  - Implement wound care per orders  - Initiate isolation precautions as appropriate  - Initiate Pressure Ulcer prevention bundle as indicated  Outcome: Progressing     Problem: MUSCULOSKELETAL - ADULT  Goal: Return mobility to safest level of function  Description: INTERVENTIONS:  - Assess patient stability and activity tolerance for standing, transferring and ambulating w/ or w/o assistive devices  - Assist with transfers and ambulation using safe patient handling equipment as needed  - Ensure adequate protection for  wounds/incisions during mobilization  - Obtain PT/OT consults as needed  - Advance activity as appropriate  - Communicate ordered activity level and limitations with patient/family  Outcome: Progressing

## 2024-06-14 NOTE — PROGRESS NOTES
[facility]  Progress Note     Loreta Coy  : 1929    Status: Inpatient  Day #: 2    Attending: Mo Spain MD  PCP: Morteza Isabel MD     Assessment and Plan:    B/l LE cellulitis with ulcerations  -culture + pansensitive PSAR  -Adverse reaction to cipro outpatient  -cont cefepime IV per ID  -midline placement for outpatient IV abx    CKD stage 3-4  -stable. Monitor    HTN  -cont meds and monitor    HL  -cont statin    Hypothyroidism  -cont levothyroxine    H/o DVT  -on eliquis    Dispo:  pending. May need KAVITHA.        DVT Mechanical Prophylaxis:        DVT Pharmacologic Prophylaxis   Medication    apixaban (Eliquis) tab 5 mg         DVT Pharmacologic prophylaxis: Aspirin 81 mg     Subjective:      Initial Chief Complaint:  b/l leg infection    Stable b/l LE pain. No CP, no SOB, no abd pain, no n/v.     10 point ROS completed and was negative, except for pertinent positive and negatives stated in subjective.      Objective:      Temp:  [97.2 °F (36.2 °C)-98.5 °F (36.9 °C)] 97.2 °F (36.2 °C)  Pulse:  [73-82] 73  Resp:  [16-17] 16  BP: (118-131)/(60-70) 131/64  SpO2:  [96 %-98 %] 98 %  General:  Alert, no distress  HEENT:  Normocephalic, atraumatic  Cardiac:  Regular rate, regular rhythm  Pulmonary:  Clear to auscultation bilaterally, respirations unlabored  Gastrointestinal:  Soft, non-tender, normal bowel sounds  Musculoskeletal:  No joint swelling  Extremities:  feet wrapped  Neurologic:  nonfocal  Psychiatric:  Normal affect, calm and appropriate    Intake/Output Summary (Last 24 hours) at 2024 1415  Last data filed at 2024 0900  Gross per 24 hour   Intake 640 ml   Output --   Net 640 ml         Recent Labs   Lab 24  1051 24  0543 24  0640   WBC 6.3 5.0 4.4   HGB 11.1* 10.4* 10.3*   HCT 35.6 31.9* 31.5*   .0 186.0 194.0   RBC 4.12 3.76* 3.73*   MCV 86.4 84.8 84.5   MCH 26.9 27.7 27.6   MCHC 31.2 32.6 32.7   RDW 15.0 14.8 14.7   NEPRELIM 4.82 3.31 2.76      Recent Labs   Lab 06/12/24  1051 06/13/24  0543 06/14/24  0640   BUN 21 20 21   CREATSERUM 1.37* 1.30* 1.29*   CA 9.2 8.3* 8.5*   ALB  --   --  3.1*    140 141   K 4.4 4.2 4.2    110 110   CO2 26.0 24.0 26.0   * 77 78   BILT  --   --  0.5   AST  --   --  10   ALT  --   --  <7*   ALKPHO  --   --  66   TP  --   --  5.8   CRP 1.80*  --   --        No results found.      Medications:   miconazole   Topical BID    cefepime  1 g Intravenous Q24H    allopurinol  100 mg Oral Daily    amLODIPine  5 mg Oral Every afternoon at 1400    apixaban  5 mg Oral BID    aspirin  81 mg Oral Daily    atorvastatin  20 mg Oral Nightly    levothyroxine  88 mcg Oral Before breakfast    lisinopril  20 mg Oral Daily    metoprolol succinate ER  25 mg Oral Nightly      PRN Meds:   acetaminophen    ondansetron    metoclopramide    Supplementary Documentation:        MDM High. Time spent on chart/note review, review of labs/imaging, discussion with patient, physical exam, discussion with staff, consultants, coordinating care, writing progress note, discussion of plan of care.      Mo Spain MD

## 2024-06-14 NOTE — PROGRESS NOTES
[facility]  part of [facility] Infectious Disease  Progress Note    Loreta Coy Patient Status:  Inpatient    1929 MRN [id number]   Location [facility] 5SW/SE Attending Mo Spain MD   Hosp Day # 2 PCP Morteza Isabel MD     Subjective:  Patient seen/examined.  Clinical course reviewed since admission.  IV cefepime ongoing.  Wound nursing input appreciated.  Patient wants to go to [facility] for rehab but no bed available.    Objective:  Blood pressure 131/64, pulse 73, temperature 97.2 °F (36.2 °C), temperature source Oral, resp. rate 16, height 5' (1.524 m), weight 132 lb 8 oz (60.1 kg), SpO2 98%.    Intake/Output:    Intake/Output Summary (Last 24 hours) at 2024 1202  Last data filed at 2024 0900  Gross per 24 hour   Intake 640 ml   Output --   Net 640 ml       Physical Exam:  General: Awake, alert, non-tox, NAD.  HEENT:  Oropharynx clear, trachea ML.  Heart: RRR S1S2 no murmurs.  Lungs: Essentially CTA b/l, no rhonchi, rales, wheezes.  Abdomen: Soft, NT/ND.  BS present.  No organomegaly.  Extremity: Stable excoriations to b/l LEs.  Images reviewed.  Neurological: No focal deficits.  Derm:  Warm, dry, free from rashes.      Site Assessment Clean;Dry;Fragile;Excoriated;Pain       Lab Data Review:  Lab Results   Component Value Date    WBC 4.4 2024    HGB 10.3 2024    HCT 31.5 2024    .0 2024    CREATSERUM 1.29 2024    BUN 21 2024     2024    K 4.2 2024     2024    CO2 26.0 2024    GLU 78 2024    CA 8.5 2024    ALB 3.1 2024    ALKPHO 66 2024    BILT 0.5 2024    TP 5.8 2024    AST 10 2024    ALT <7 2024      Cultures:   Outpatient cultures with pansensitive pseudomonas     Assessment and Plan:     Complicated pseudomonas cellulitis to the b/l LEs with worsening wounds, drainage, and excoriations in spite of outpatient  wound nursing services  - Outpatient cultures with pansensitive pseudomonas  - Was not able to tolerate p.o. ciprofloxacin due to dizziness  - IV cefepime day #3     2.  H/o renal insufficiency  - Will renally dose antibiotics     3.  Disposition - stable from ID.  IV cefepime ongoing.  Planning for ECF for 2-3 weeks of IV Rx.  Needs to f/u with ID in 1-2 weeks after discharge so we can determine definitive EOT.  OK for ECF at any time once all arrangements made.  D/w patient.    Urmila Owen DO, [facility] Infectious Disease  [phone]    6/14/2024  12:02 PM

## 2024-06-14 NOTE — CM/SW NOTE
[facility] approved KAVITHA. Drake Covington at [facility] confirmed no bed is available for KAVITHA at this time. Pt agreeable to [facility] at dc if no bed is available at  on day of discharge. [facility] reserved in [facility].    1735: Possible dc this weekend. [facility] placed in will call from 6/15-17, PCS updated.    Plan: [facility] for KAVITHA pending medical clearance.    Javad Dover, ORESTESN    [phone]

## 2024-06-15 VITALS
SYSTOLIC BLOOD PRESSURE: 138 MMHG | RESPIRATION RATE: 18 BRPM | TEMPERATURE: 98 F | HEART RATE: 65 BPM | OXYGEN SATURATION: 98 % | HEIGHT: 60 IN | BODY MASS INDEX: 26.01 KG/M2 | DIASTOLIC BLOOD PRESSURE: 82 MMHG | WEIGHT: 132.5 LBS

## 2024-06-15 PROCEDURE — 99239 HOSP IP/OBS DSCHRG MGMT >30: CPT | Performed by: HOSPITALIST

## 2024-06-15 NOTE — PROGRESS NOTES
[facility]  Progress Note     Loreta Coy  : 1929    Status: Inpatient  Day #: 3    Attending: Mo Spain MD  PCP: Morteza Isabel MD     Assessment and Plan:    B/l LE cellulitis with ulcerations  -culture + pansensitive PSAR  -Adverse reaction to cipro outpatient  -cont cefepime IV per ID  -midline placed for outpatient IV abx    CKD stage 3-4  -stable. Monitor    HTN  -cont meds and monitor    HL  -cont statin    Hypothyroidism  -cont levothyroxine    H/o DVT  -on eliquis    Dispo:  discharge to [facility] when bed available       DVT Mechanical Prophylaxis:        DVT Pharmacologic Prophylaxis   Medication    apixaban (Eliquis) tab 5 mg         DVT Pharmacologic prophylaxis: Aspirin 81 mg     Subjective:      Initial Chief Complaint:  b/l leg infection    Stable b/l LE pain. No CP, no SOB, no abd pain, no n/v.     10 point ROS completed and was negative, except for pertinent positive and negatives stated in subjective.      Objective:      Temp:  [97.2 °F (36.2 °C)-98.6 °F (37 °C)] 97.7 °F (36.5 °C)  Pulse:  [67-73] 71  Resp:  [16-18] 18  BP: (127-136)/(60-85) 135/85  SpO2:  [97 %-98 %] 97 %  General:  Alert, no distress  HEENT:  Normocephalic, atraumatic  Cardiac:  Regular rate, regular rhythm  Pulmonary:  Clear to auscultation bilaterally, respirations unlabored  Gastrointestinal:  Soft, non-tender, normal bowel sounds  Musculoskeletal:  No joint swelling  Extremities:  feet wrapped  Neurologic:  nonfocal  Psychiatric:  Normal affect, calm and appropriate    Intake/Output Summary (Last 24 hours) at 6/15/2024 0811  Last data filed at 6/15/2024 0651  Gross per 24 hour   Intake 700 ml   Output --   Net 700 ml         Recent Labs   Lab 24  1051 24  0543 24  0640   WBC 6.3 5.0 4.4   HGB 11.1* 10.4* 10.3*   HCT 35.6 31.9* 31.5*   .0 186.0 194.0   RBC 4.12 3.76* 3.73*   MCV 86.4 84.8 84.5   MCH 26.9 27.7 27.6   MCHC 31.2 32.6 32.7   RDW 15.0 14.8 14.7   NEPRELIM 4.82 3.31  2.76     Recent Labs   Lab 06/12/24  1051 06/13/24  0543 06/14/24  0640   BUN 21 20 21   CREATSERUM 1.37* 1.30* 1.29*   CA 9.2 8.3* 8.5*   ALB  --   --  3.1*    140 141   K 4.4 4.2 4.2    110 110   CO2 26.0 24.0 26.0   * 77 78   BILT  --   --  0.5   AST  --   --  10   ALT  --   --  <7*   ALKPHO  --   --  66   TP  --   --  5.8   CRP 1.80*  --   --        No results found.      Medications:   miconazole   Topical BID    cefepime  1 g Intravenous Q24H    allopurinol  100 mg Oral Daily    amLODIPine  5 mg Oral Every afternoon at 1400    apixaban  5 mg Oral BID    aspirin  81 mg Oral Daily    atorvastatin  20 mg Oral Nightly    levothyroxine  88 mcg Oral Before breakfast    lisinopril  20 mg Oral Daily    metoprolol succinate ER  25 mg Oral Nightly      PRN Meds:   acetaminophen    ondansetron    metoclopramide    Supplementary Documentation:        MDM High. Time spent on chart/note review, review of labs/imaging, discussion with patient, physical exam, discussion with staff, consultants, coordinating care, writing progress note, discussion of plan of care.      Mo Spain MD

## 2024-06-15 NOTE — PLAN OF CARE
Problem: Patient Centered Care  Goal: Patient preferences are identified and integrated in the patient's plan of care  Description: Interventions:  - What would you like us to know as we care for you? From home w/ grandsons  - Provide timely, complete, and accurate information to patient/family  - Incorporate patient and family knowledge, values, beliefs, and cultural backgrounds into the planning and delivery of care  - Encourage patient/family to participate in care and decision-making at the level they choose  - Honor patient and family perspectives and choices  Outcome: Progressing     Problem: Patient/Family Goals  Goal: Patient/Family Long Term Goal  Description: Patient's Long Term Goal: be discharged    Interventions:  -monitor VS  -monitor appropriate labs  -update patient and family on plan of care  -follow MD orders     - See additional Care Plan goals for specific interventions  Outcome: Progressing  Goal: Patient/Family Short Term Goal  Description: Patient's Short Term Goal: feel better    Interventions:   -monitor VS  -monitor appropriate labs  -update patient and family on plan of care  -discharge planning  -follow MD orders     - See additional Care Plan goals for specific interventions  Outcome: Progressing     Problem: RISK FOR INFECTION - ADULT  Goal: Absence of fever/infection during anticipated neutropenic period  Description: INTERVENTIONS  - Monitor WBC  - Administer growth factors as ordered  - Implement neutropenic guidelines  Outcome: Progressing     Problem: SAFETY ADULT - FALL  Goal: Free from fall injury  Description: INTERVENTIONS:  - Assess pt frequently for physical needs  - Identify cognitive and physical deficits and behaviors that affect risk of falls.  - [place] fall precautions as indicated by assessment.  - Educate pt/family on patient safety including physical limitations  - Instruct pt to call for assistance with activity based on assessment  - Modify environment to  reduce risk of injury  - Provide assistive devices as appropriate  - Consider OT/PT consult to assist with strengthening/mobility  - Encourage toileting schedule  Outcome: Progressing     Problem: DISCHARGE PLANNING  Goal: Discharge to home or other facility with appropriate resources  Description: INTERVENTIONS:  - Identify barriers to discharge w/pt and caregiver  - Include patient/family/discharge partner in discharge planning  - Arrange for needed discharge resources and transportation as appropriate  - Identify discharge learning needs (meds, wound care, etc)  - Arrange for interpreters to assist at discharge as needed  - Consider post-discharge preferences of patient/family/discharge partner  - Complete POLST form as appropriate  - Assess patient's ability to be responsible for managing their own health  - Refer to Case Management Department for coordinating discharge planning if the patient needs post-hospital services based on physician/LIP order or complex needs related to functional status, cognitive ability or social support system  Outcome: Progressing     Problem: SKIN/TISSUE INTEGRITY - ADULT  Goal: Incision(s), wounds(s) or drain site(s) healing without S/S of infection  Description: INTERVENTIONS:  - Assess and document risk factors for pressure ulcer development  - Assess and document skin integrity  - Assess and document dressing/incision, wound bed, drain sites and surrounding tissue  - Implement wound care per orders  - Initiate isolation precautions as appropriate  - Initiate Pressure Ulcer prevention bundle as indicated  Outcome: Progressing     Problem: MUSCULOSKELETAL - ADULT  Goal: Return mobility to safest level of function  Description: INTERVENTIONS:  - Assess patient stability and activity tolerance for standing, transferring and ambulating w/ or w/o assistive devices  - Assist with transfers and ambulation using safe patient handling equipment as needed  - Ensure adequate protection for  wounds/incisions during mobilization  - Obtain PT/OT consults as needed  - Advance activity as appropriate  - Communicate ordered activity level and limitations with patient/family  Outcome: Progressing 6/15/24 discharging to [facility] today via [facility] with [facility] for iv abx.

## 2024-06-15 NOTE — PROGRESS NOTES
Report given to [facility] herman [place], pt transported to [facility] with belongings, midline picc to right upper arm flushed, intact, [facility] transport via wheelchair.  No c/o voiced.

## 2024-06-15 NOTE — CM/SW NOTE
Pt received MDO for discharge. Patient inquired if there is a bed at . DARLING followed up with Nadya from , there is no beds at this time. Patient will be discharging to [facility] to [place].        06/15/24 0900   Discharge disposition   Expected discharge disposition subacute   Post Acute Care Provider [facility]   Discharge transportation [facility]     DARLING ordered transportation of a [facility] through [facility]. [facility] will transport the pt at 1:00 PM to [facility]  PCS form/ flow sheet completed. RN to attach and print out with After Visit Summary Packet. Patient/family notified transport is not covered by insurance. Pt/family are agreeable to the charges. Patient is going [facility]  RN is to call #[phone] for report.    Plan: Pending medical clearance, DC to [facility] @ 1pm.     DARLING/EL to remain available for support and/or discharge planning.     Shavonne Johnson, MSW, LSW   x [phone]

## 2024-06-15 NOTE — PLAN OF CARE
Problem: Patient Centered Care  Goal: Patient preferences are identified and integrated in the patient's plan of care  Description: Interventions:  - What would you like us to know as we care for you? From home w/ grandsons  - Provide timely, complete, and accurate information to patient/family  - Incorporate patient and family knowledge, values, beliefs, and cultural backgrounds into the planning and delivery of care  - Encourage patient/family to participate in care and decision-making at the level they choose  - Honor patient and family perspectives and choices  6/15/2024 1139 by Jodi Montalvo RN  Outcome: Completed  6/15/2024 1127 by Jodi Montalvo RN  Outcome: Progressing     Problem: Patient/Family Goals  Goal: Patient/Family Long Term Goal  Description: Patient's Long Term Goal: be discharged    Interventions:  -monitor VS  -monitor appropriate labs  -update patient and family on plan of care  -follow MD orders     - See additional Care Plan goals for specific interventions  6/15/2024 1139 by Jodi Montalvo RN  Outcome: Completed  6/15/2024 1127 by Jodi Montalvo RN  Outcome: Progressing  Goal: Patient/Family Short Term Goal  Description: Patient's Short Term Goal: feel better    Interventions:   -monitor VS  -monitor appropriate labs  -update patient and family on plan of care  -discharge planning  -follow MD orders     - See additional Care Plan goals for specific interventions  6/15/2024 1139 by Jodi Montalvo RN  Outcome: Completed  6/15/2024 1127 by Jdoi Montalvo RN  Outcome: Progressing     Problem: RISK FOR INFECTION - ADULT  Goal: Absence of fever/infection during anticipated neutropenic period  Description: INTERVENTIONS  - Monitor WBC  - Administer growth factors as ordered  - Implement neutropenic guidelines  6/15/2024 1139 by Jodi Montalvo RN  Outcome: Completed  6/15/2024 1127 by Jodi Montalvo  RN  Outcome: Progressing     Problem: SAFETY ADULT - FALL  Goal: Free from fall injury  Description: INTERVENTIONS:  - Assess pt frequently for physical needs  - Identify cognitive and physical deficits and behaviors that affect risk of falls.  - [place] fall precautions as indicated by assessment.  - Educate pt/family on patient safety including physical limitations  - Instruct pt to call for assistance with activity based on assessment  - Modify environment to reduce risk of injury  - Provide assistive devices as appropriate  - Consider OT/PT consult to assist with strengthening/mobility  - Encourage toileting schedule  6/15/2024 1139 by Jodi Montalvo RN  Outcome: Completed  6/15/2024 1127 by Jodi Montalvo RN  Outcome: Progressing     Problem: DISCHARGE PLANNING  Goal: Discharge to home or other facility with appropriate resources  Description: INTERVENTIONS:  - Identify barriers to discharge w/pt and caregiver  - Include patient/family/discharge partner in discharge planning  - Arrange for needed discharge resources and transportation as appropriate  - Identify discharge learning needs (meds, wound care, etc)  - Arrange for interpreters to assist at discharge as needed  - Consider post-discharge preferences of patient/family/discharge partner  - Complete POLST form as appropriate  - Assess patient's ability to be responsible for managing their own health  - Refer to Case Management Department for coordinating discharge planning if the patient needs post-hospital services based on physician/LIP order or complex needs related to functional status, cognitive ability or social support system  6/15/2024 1139 by Jodi Montalvo RN  Outcome: Completed  6/15/2024 1127 by Jodi Montalvo RN  Outcome: Progressing     Problem: SKIN/TISSUE INTEGRITY - ADULT  Goal: Incision(s), wounds(s) or drain site(s) healing without S/S of infection  Description: INTERVENTIONS:  - Assess  and document risk factors for pressure ulcer development  - Assess and document skin integrity  - Assess and document dressing/incision, wound bed, drain sites and surrounding tissue  - Implement wound care per orders  - Initiate isolation precautions as appropriate  - Initiate Pressure Ulcer prevention bundle as indicated  6/15/2024 1139 by Jodi Montalvo, RN  Outcome: Completed  6/15/2024 1127 by Jodi Montalvo, RN  Outcome: Progressing     Problem: MUSCULOSKELETAL - ADULT  Goal: Return mobility to safest level of function  Description: INTERVENTIONS:  - Assess patient stability and activity tolerance for standing, transferring and ambulating w/ or w/o assistive devices  - Assist with transfers and ambulation using safe patient handling equipment as needed  - Ensure adequate protection for wounds/incisions during mobilization  - Obtain PT/OT consults as needed  - Advance activity as appropriate  - Communicate ordered activity level and limitations with patient/family  6/15/2024 1139 by Jodi Montalvo, RN  Outcome: Completed  6/15/2024 1127 by Jodi Montalvo, RN  Outcome: Progressing

## 2024-06-15 NOTE — DISCHARGE INSTRUCTIONS
Follow up with Dr. Owen in 1-2 weeks call for appt.  Care for legs, wash legs with wound cleanser, pat dry, apply vaseline to legs,  apply aquacel foam, wrap in kerlix roll  DAILY.

## 2024-06-15 NOTE — DISCHARGE SUMMARY
[facility]  Discharge Summary     Loreta Coy  : 1929    Status: Inpatient  Day #: 3    Attending: Mo Spain MD  PCP: Morteza Isabel MD     Date of Admission:  2024  Date of Discharge:  6/15/2024     Hospital Discharge Diagnoses:     B/l LE cellulitis with ulcerations  CKD stage 3-4  HTN  HL  Hypothyroidism  H/o DVT      History of Present Illness:     Copied from Admission H&P:    The patient is a 89-year-old  female who had chronic lymphedema and cellulitis with superficial wounds both legs and heels for the last 2 to 3 weeks. She had outpatient cultures growing pansensitive Pseudomonas aeruginosa. Started on oral ciprofloxacin, which she could not tolerate secondary to intense dizziness. She was seen by Infectious Disease for consultation, Dr. Owen, yesterday, and today she was sent to the emergency department for evaluation to initiate IV cefepime. The patient had CBC which was unremarkable. Cefepime was initiated. Chemistry, blood cultures, C-reactive protein, and sedimentation rate are still pending.       Hospital Course:     B/l LE cellulitis with ulcerations  -culture + pansensitive PSAR  -Adverse reaction to cipro outpatient  -cont cefepime IV per ID x2-3 weeks. To f/u ID 1-2 weeks after discharge  -midline placed for outpatient IV abx     CKD stage 3-4  -stable. Monitor     HTN  -cont meds and monitor     HL  -cont statin     Hypothyroidism  -cont levothyroxine     [facility]     Consultants         Provider   Role Specialty     Urmila Owen DO      Consulting Physician INFECTIOUS DISEASES             Physical Exam:   Blood pressure 138/82, pulse 65, temperature 98.1 °F (36.7 °C), temperature source Oral, resp. rate 18, height 5' (1.524 m), weight 132 lb 8 oz (60.1 kg), SpO2 98%.  General:  Alert, no distress  HEENT:  Normocephalic, atraumatic  Cardiac:  Regular rate, regular rhythm  Pulmonary:  Clear to auscultation bilaterally,  respirations unlabored  Gastrointestinal:  Soft, non-tender, normal bowel sounds  Musculoskeletal:  No joint swelling  Extremities:  No edema, no cyanosis, no clubbing  Neurologic:  nonfocal  Psychiatric:  Normal affect, calm and appropriate         Discharge Medications        START taking these medications        Instructions Prescription details   cefepime HCl 1 GM/50ML Soln  Commonly known as: MAXIPIME      Inject 50 mL (1 g total) into the vein daily for 14 days. Check once weekly CBC, CMP, CRP and fax to [phone].  PICC care q week.  Flushes per [facility] protocol.   Stop taking on: June 28, 2024  Quantity: 700 mL  Refills: 0            CHANGE how you take these medications        Instructions Prescription details   apixaban 5 MG Tabs  Commonly known as: Dong  What changed:   how much to take  how to take this  when to take this  additional instructions      Take 2 tabs (10mg) by mouth twice daily for 7 days, then take 1 tab (5mg) by mouth twice daily thereafter.   Quantity: 74 tablet  Refills: 0            CONTINUE taking these medications        Instructions Prescription details   ALLOPURINOL OR      Take 100 mg by mouth daily.   Refills: 0     amLODIPine 5 MG Tabs  Commonly known as: Norvasc      Take 1 tablet (5 mg total) by mouth Every afternoon at 2:00 pm.   Refills: 0     aspirin 81 MG Tbec      Take 1 tablet (81 mg total) by mouth daily.   Refills: 0     atorvastatin 20 MG Tabs  Commonly known as: Lipitor      Take 1 tablet (20 mg total) by mouth nightly.   Refills: 0     CALCIUM+D3 OR      Take by mouth.   Refills: 0     Cholecalciferol 125 MCG (5000 UT) Tabs  Commonly known as: VITAMIN D-3      Take 1 tablet (5,000 Units total) by mouth every 14 (fourteen) days.   Refills: 0     levothyroxine 88 MCG Tabs  Commonly known as: Synthroid      Take 1 tablet (88 mcg total) by mouth before breakfast.   Refills: 0     lisinopril 40 MG Tabs  Commonly known as: Prinivil; Zestril      Take 0.5 tablets (20 mg  total) by mouth daily.   Refills: 0     metoprolol succinate ER 50 MG Tb24  Commonly known as: Toprol XL      Take 0.5 tablets (25 mg total) by mouth at bedtime.   Refills: 0               Where to Get Your Medications        Please  your prescriptions at the location directed by your doctor or nurse    Bring a paper prescription for each of these medications  cefepime HCl 1 GM/50ML Soln           Hospital Discharge Diagnoses:  b/l LE PSAR cellulitis with ulcerations    Lace+ Score: 47  59-90 High Risk  29-58 Medium Risk  0-28   Low Risk.    TCM Follow-Up Recommendation:  LACE 29-58: Moderate Risk of readmission after discharge from the hospital.        I spent >30 minutes on this discharge. Discussed treatment and discharge plans.       Mo Spain MD

## (undated) NOTE — LETTER
Hospital Discharge Documentation  Please phone to schedule a hospital follow up appointment.    From: [facility] Hospitalist's Office  Phone: [phone]    Patient discharged time/date: 6/15/2024  1:30 PM  Patient discharge disposition:  SNF Subacute Rehab, patient discharged to [facility].       Discharge Summary - D/C Summary        Discharge Summary signed by Mo Spain MD at 6/15/2024 10:44 AM  Version 1 of 1      Author: Mo Spain MD Service: Hospitalist Author Type: Physician    Filed: 6/15/2024 10:44 AM Date of Service: 6/15/2024 10:44 AM Status: Signed    : Mo Spain MD (Physician)         [facility]  Discharge Summary     Loreta Coy  : 1929    Status: Inpatient  Day #: 3    Attending: Mo Spain MD  PCP: Morteza Isabel MD     Date of Admission:  2024  Date of Discharge:  6/15/2024     Hospital Discharge Diagnoses:     B/l LE cellulitis with ulcerations  CKD stage 3-4  HTN  HL  Hypothyroidism  H/o DVT      History of Present Illness:     Copied from Admission H&P:    The patient is a 89-year-old  female who had chronic lymphedema and cellulitis with superficial wounds both legs and heels for the last 2 to 3 weeks. She had outpatient cultures growing pansensitive Pseudomonas aeruginosa. Started on oral ciprofloxacin, which she could not tolerate secondary to intense dizziness. She was seen by Infectious Disease for consultation, Dr. Owen, yesterday, and today she was sent to the emergency department for evaluation to initiate IV cefepime. The patient had CBC which was unremarkable. Cefepime was initiated. Chemistry, blood cultures, C-reactive protein, and sedimentation rate are still pending.       Hospital Course:     B/l LE cellulitis with ulcerations  -culture + pansensitive PSAR  -Adverse reaction to cipro outpatient  -cont cefepime IV per ID x2-3 weeks. To f/u ID 1-2 weeks after discharge  -midline placed for outpatient IV abx     CKD stage  3-4  -stable. Monitor     HTN  -cont meds and monitor     HL  -cont statin     Hypothyroidism  -cont levothyroxine     H/o DVT  -on eliquis     Consultants         Provider   Role Specialty     Urmila Owen DO      Consulting Physician INFECTIOUS DISEASES             Physical Exam:   Blood pressure 138/82, pulse 65, temperature 98.1 °F (36.7 °C), temperature source Oral, resp. rate 18, height 5' (1.524 m), weight 132 lb 8 oz (60.1 kg), SpO2 98%.  General:  Alert, no distress  HEENT:  Normocephalic, atraumatic  Cardiac:  Regular rate, regular rhythm  Pulmonary:  Clear to auscultation bilaterally, respirations unlabored  Gastrointestinal:  Soft, non-tender, normal bowel sounds  Musculoskeletal:  No joint swelling  Extremities:  No edema, no cyanosis, no clubbing  Neurologic:  nonfocal  Psychiatric:  Normal affect, calm and appropriate         Discharge Medications        START taking these medications        Instructions Prescription details   cefepime HCl 1 GM/50ML Soln  Commonly known as: MAXIPIME      Inject 50 mL (1 g total) into the vein daily for 14 days. Check once weekly CBC, CMP, CRP and fax to [phone].  PICC care q week.  Flushes per [facility] protocol.   Stop taking on: June 28, 2024  Quantity: 700 mL  Refills: 0            CHANGE how you take these medications        Instructions Prescription details   apixaban 5 MG Tabs  Commonly known as: Eliquis  What changed:   how much to take  how to take this  when to take this  additional instructions      Take 2 tabs (10mg) by mouth twice daily for 7 days, then take 1 tab (5mg) by mouth twice daily thereafter.   Quantity: 74 tablet  Refills: 0            CONTINUE taking these medications        Instructions Prescription details   ALLOPURINOL OR      Take 100 mg by mouth daily.   Refills: 0     amLODIPine 5 MG Tabs  Commonly known as: Norvasc      Take 1 tablet (5 mg total) by mouth Every afternoon at 2:00 pm.   Refills: 0     aspirin 81 MG Tbec      Take  1 tablet (81 mg total) by mouth daily.   Refills: 0     atorvastatin 20 MG Tabs  Commonly known as: Lipitor      Take 1 tablet (20 mg total) by mouth nightly.   Refills: 0     CALCIUM+D3 OR      Take by mouth.   Refills: 0     Cholecalciferol 125 MCG (5000 UT) Tabs  Commonly known as: VITAMIN D-3      Take 1 tablet (5,000 Units total) by mouth every 14 (fourteen) days.   Refills: 0     levothyroxine 88 MCG Tabs  Commonly known as: Synthroid      Take 1 tablet (88 mcg total) by mouth before breakfast.   Refills: 0     lisinopril 40 MG Tabs  Commonly known as: Prinivil; Zestril      Take 0.5 tablets (20 mg total) by mouth daily.   Refills: 0     metoprolol succinate ER 50 MG Tb24  Commonly known as: Toprol XL      Take 0.5 tablets (25 mg total) by mouth at bedtime.   Refills: 0               Where to Get Your Medications        Please  your prescriptions at the location directed by your doctor or nurse    Bring a paper prescription for each of these medications  cefepime HCl 1 GM/50ML Soln           Hospital Discharge Diagnoses:  b/l LE PSAR cellulitis with ulcerations    Lace+ Score: 47  59-90 High Risk  29-58 Medium Risk  0-28   Low Risk.    TCM Follow-Up Recommendation:  LACE 29-58: Moderate Risk of readmission after discharge from the hospital.        I spent >30 minutes on this discharge. Discussed treatment and discharge plans.       Mo Spain MD      Electronically signed by Mo Spain MD on 6/15/2024 10:44 AM

## (undated) NOTE — IP AVS SNAPSHOT
Patient Demographics     Address  [address] [facility] [address] [address] Phone  [phone] (Home)      Patient Contacts     Name Relation Home Work Mobile    Kirt Esposito Son [phone]      Yoli Esposito Daughter       Joe Esposito   [phone]    nelda esposito Relative   [phone]      Allergies as of 6/15/2024  Review status set to Review Complete on 6/12/2024       Noted Reaction Type Reactions    Ciprofloxacin 04/29/2024    DIZZINESS      Code Status Information     Code Status    Full Code        Patient Instructions       Follow up with Dr. Owen in 1-2 weeks call for appt.  Care for legs, wash legs with wound cleanser, pat dry, apply vaseline to legs,  apply aquacel foam, wrap in kerlix roll  DAILY.     Patient Isolation Status     None to display      Microbiology Results (All)     Procedure Component Value Units Date/Time    Blood Culture [[id number]] Collected: 06/12/24 1111    Order Status: Completed Lab Status: Preliminary result Updated: 06/14/24 1301    Specimen: Blood,peripheral      Blood Culture Result No Growth 2 Days    Narrative:      Aerobic Bottle Volume - 6 mL  Anaerobic Bottle Volume - 6 mL    Blood Culture [[id number]] Collected: 06/12/24 1135    Order Status: Completed Lab Status: Preliminary result Updated: 06/14/24 1201    Specimen: Blood,peripheral      Blood Culture Result No Growth 2 Days         Your Home Meds List      TAKE these medications       Instructions Authorizing Provider Morning Afternoon Evening As Needed   ALLOPURINOL OR  Next dose due: Tomorrow morning      Take 100 mg by mouth daily.          amLODIPine 5 MG Tabs  Commonly known as: Norvasc  Next dose due: Today at 2pm      Take 1 tablet (5 mg total) by mouth Every afternoon at 2:00 pm.          apixaban 5 MG Tabs  Commonly known as: Eliquis  Next dose due: Tonight at 9pm      Take 2 tabs (10mg) by mouth twice daily for 7 days, then take 1 tab (5mg) by mouth twice daily thereafter.   Tariq GUERRERO  Tera         aspirin 81 MG Tbec  Next dose due: Tomorrow morning      Take 1 tablet (81 mg total) by mouth daily.          atorvastatin 20 MG Tabs  Commonly known as: Lipitor  Next dose due: Tonight at 9pm      Take 1 tablet (20 mg total) by mouth nightly.          CALCIUM+D3 OR      Take by mouth.          cefepime HCl 1 GM/50ML Soln  Commonly known as: MAXIPIME  Next dose due: As scheduled      Inject 50 mL (1 g total) into the vein daily for 14 days. Check once weekly CBC, CMP, CRP and fax to [phone].  PICC care q week.  Flushes per [facility] protocol.  Stop taking on: June 28, 2024   Urmila Estefany Owen         Cholecalciferol 125 MCG (5000 UT) Tabs  Commonly known as: VITAMIN D-3  Next dose due: Continue home schedule      Take 1 tablet (5,000 Units total) by mouth every 14 (fourteen) days.          levothyroxine 88 MCG Tabs  Commonly known as: Synthroid  Next dose due: Tomorrow morning      Take 1 tablet (88 mcg total) by mouth before breakfast.          lisinopril 40 MG Tabs  Commonly known as: Prinivil; Zestril  Next dose due: Tomorrow morning      Take 0.5 tablets (20 mg total) by mouth daily.          metoprolol succinate ER 50 MG Tb24  Commonly known as: Toprol XL  Next dose due: Tonight at 9pm      Take 0.5 tablets (25 mg total) by mouth at bedtime.                Where to Get Your Medications      Please  your prescriptions at the location directed by your doctor or nurse    Bring a paper prescription for each of these medications  cefepime HCl 1 GM/50ML Soln           [id number] - MAR ACTION REPORT  (last 48 hrs)    ** SITE UNKNOWN **     Order ID Medication Name Action Time Action Reason Comments    [id number] allopurinol (Zyloprim) tab 100 mg 06/14/24 0819 Given      [id number] allopurinol (Zyloprim) tab 100 mg 06/15/24 0822 Given      [id number] amLODIPine (Norvasc) tab 5 mg 06/13/24 1344 Given      [id number] amLODIPine (Norvasc) tab 5 mg 06/14/24 1642 Given      [id number] apixaban (Eliquis)  tab 5 mg 06/13/24 2138 Given      [id number] apixaban (Eliquis) tab 5 mg 06/14/24 0818 Given      [id number] apixaban (Eliquis) tab 5 mg 06/14/24 2127 Given      [id number] apixaban (Eliquis) tab 5 mg 06/15/24 0822 Given      [id number] aspirin DR tab 81 mg 06/14/24 0818 Given      [id number] aspirin DR tab 81 mg 06/15/24 0822 Given      [id number] atorvastatin (Lipitor) tab 20 mg 06/13/24 2138 Given      [id number] atorvastatin (Lipitor) tab 20 mg 06/14/24 2127 Given      [id number] ceFEPIme (Maxipime) 1 g in sodium chloride 0.9% 100 mL IVPB-MBP 06/14/24 0453 New Bag      [id number] ceFEPIme (Maxipime) 1 g in sodium chloride 0.9% 100 mL IVPB-MBP 06/15/24 0651 New Bag      [id number] levothyroxine (Synthroid) tab 88 mcg 06/14/24 0501 Given      [id number] levothyroxine (Synthroid) tab 88 mcg 06/15/24 0651 Given      [id number] lisinopril (Prinivil; Zestril) tab 20 mg 06/14/24 0819 Given      [id number] lisinopril (Prinivil; Zestril) tab 20 mg 06/15/24 0822 Given      [id number] metoprolol succinate ER (Toprol XL) 24 hr tab 25 mg 06/13/24 2138 Given      [id number] metoprolol succinate ER (Toprol XL) 24 hr tab 25 mg 06/14/24 2127 Given      [id number] miconazole 2 % powder 06/13/24 2138 Given      [id number] miconazole 2 % powder 06/14/24 0900 Given      [id number] miconazole 2 % powder 06/14/24 2127 Given      [id number] miconazole 2 % powder 06/15/24 0822 Given              Recent Vital Signs    Flowsheet Row Most Recent Value   /82 Filed at 06/15/2024 0823   Pulse 65 Filed at 06/15/2024 0823   Resp 18 Filed at 06/15/2024 0823   Temp 98.1 °F (36.7 °C) Filed at 06/15/2024 0823   SpO2 98 % Filed at 06/15/2024 0823      Patient's Most Recent Weight    Flowsheet Row Most Recent Value   Patient Weight 60.1 kg (132 lb 8 oz)      Lab Results Last 24 Hours    No matching results found     Pending Labs     Order Current Status    Blood Culture Preliminary result    Blood Culture Preliminary result         H&P - H&P Note      H&P signed  by Raffi Huber MD at 6/13/2024  9:32 AM   Version 1 of 1    Author: Raffi Huber MD Service: Hospitalist Author Type: Physician    Filed: 6/13/2024  9:32 AM Status: Signed    : Raffi Huber MD (Physician)       [facility]    PATIENT'S NAME: ESCOBAR OLVERA   ATTENDING PHYSICIAN: Steve Randall MD   PATIENT ACCOUNT#:   [id number]    LOCATION:  [place] [address]  MEDICAL RECORD #:   [id number]       YOB: 1929  ADMISSION DATE:       06/12/2024    HISTORY AND PHYSICAL EXAMINATION    CHIEF COMPLAINT:  Bilateral lower extremity cellulitis.    HISTORY OF PRESENT ILLNESS:  The patient is a 89-year-old  female who had chronic lymphedema and cellulitis with superficial wounds both legs and heels for the last 2 to 3 weeks.  She had outpatient cultures growing pansensitive Pseudomonas aeruginosa.  Started on oral ciprofloxacin, which she could not tolerate secondary to intense dizziness.  She was seen by Infectious Disease for consultation, Dr. Owen, yesterday, and today she was sent to the emergency department for evaluation to initiate IV cefepime.  The patient had CBC which was unremarkable.  Cefepime was initiated.  Chemistry, blood cultures, C-reactive protein, and sedimentation rate are still pending.    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    PAST SURGICAL HISTORY:  None.    MEDICATIONS:  Please see medication reconciliation list.    ALLERGIES:  No known drug allergies.     FAMILY HISTORY:  The patient is not able to recall any details.    SOCIAL HISTORY:  No tobacco, alcohol, or drug use.  Lives with her family.  Requires assistance in her basic activities of daily living.    REVIEW OF SYSTEMS:  The patient reports she  had chronic lymphedema with superficial ulcerations around both heels and the dorsum of left foot and right foot with progressive erythema and oozing from superficial wounds in both feet, has been persistent for the last 2 to 3 weeks.  No fever or chills.  Other 12-point review of systems negative.      PHYSICAL EXAMINATION:    GENERAL:  Alert, oriented to time, place, and person, pleasant, no acute distress.  VITAL SIGNS:  Temperature 97.9, pulse 79, respiratory rate 16, blood pressure 128/70, pulse ox 98% on room air.  HEENT:  Atraumatic.  Oropharynx clear.  Moist mucous membranes.  Ears, nose normal.  Eyes:  Anicteric sclerae.   NECK:  Supple.  No lymphadenopathy.  Trachea midline.  Full range of motion.  LUNGS:  Clear to auscultation bilaterally.  Normal respiratory effort.   HEART:  Regular rate and rhythm.  S1 and S2 auscultated.  No murmur.  ABDOMEN:  Soft, nondistended.  No tenderness.  Positive bowel sounds.  EXTREMITIES:  Chronic lymphedema with erythema extending from midsection both legs toward bilateral feet with superficial ulceration noted on the lateral aspect of the left hindfoot and sloughing of the skin noted both ankles, knees, and forefeet bilaterally.  NEUROLOGIC:  Motor and sensory intact.    ASSESSMENT AND PLAN:    1.   Bilateral lower extremity cellulitis, superficial ulcerations, positive culture for pansensitive Pseudomonas aeruginosa.  2.   Chronic kidney disease stage 3.  3.   Hypertension.    The patient will be admitted to general medical floor.   IV cefepime.  Pain control.  Wound Service consult.  Infectious Disease consult.  Further recommendations to follow.    Dictated By Raffi Huber MD  d: 06/12/2024 11:28:34  t: 06/12/2024 11:30:55  Job [id number]  FB/    cc: Steve Randall MD      Electronically signed by Raffi Huber MD on 6/13/2024  9:32 AM              Consults - MD Consult Notes      Consults signed by Urmila Owen DO at 6/12/2024  1:33 PM      Author: Urmila Owen DO Service: — Author Type: Physician    Filed: 2024  1:33 PM Date of Service: 2024 11:08 AM Status: Signed    : Urmila Owen DO (Physician)     Consult Orders    1. ED Consult to Infectious Disease [[id number]] ordered by Steve Randall MD at 24 1045             [facility]  part of [facility] Infectious Disease  Report of Consultation    Loreta Coy Patient Status:  Emergency    1929 MRN [id number]   Location [facility] EMERGENCY DEPARTMENT Attending Steve Randall MD   Hosp Day # 0 PCP Morteza Isabel MD     Date of Admission:  2024  Date of Consult:  2024    Reason for Consultation:  Pseudomonas cellulitis    History of Present Illness:  Loreta Coy is a a(n) 89 year old female being seen at your request regarding pseudomonas cellulitis.  Patient was seen in my office yesterday for the same.  Patient with chronic stasis dermatitis, fungal dermatitis, and excoriations to her b/l LEs.  These are managed with home wound care services.  Recently she had some interval worsening in the appearance of her wounds and cultures were obtained which isolated pseudomonas.  She was placed on a course of oral ciprofloxacin and developed dizziness on this Rx.  She was then advised to seek ID evaluation for IV Rx.    Patient was seen in my office yesterday with her daughter-in-law.  She has some very intermittent chills but no s/s of sepsis.  She has been using vinegar soaks for her wounds.  After discussion - given the complexities of arranging a PICC with the potential for multiple daily doses of antibiotics and wound care needs - we elected to refer to the hospital for admission and likely SNF placement.    IV cefepime ordered in the ED and we are asked to see and assist.    History:  Past Medical History:    Back pain    Essential hypertension    Gout    Hyperlipidemia    Thyroid  disease     History reviewed. No pertinent surgical history.  History reviewed. No pertinent family history.   reports that she has never smoked. She has never used smokeless tobacco. She reports that she does not drink alcohol and does not use drugs.    Allergies:  Allergies   Allergen Reactions    Ciprofloxacin DIZZINESS       Medications:    Current Facility-Administered Medications:     ceFEPIme (Maxpime) 2 g in sodium chloride 0.9% 100 mL IVPB-MBP, 2 g, Intravenous, Once    Review of Systems:    Constitutional:  Chills.   HEENT:  No visual changes, oral ulcers, sore throat, difficulty swallowing.   Respiratory: Negative for cough, sputum, hemoptysis, chest pain, wheezing, dyspnea on exertion, or stridor.   Cardiovascular: Negative for chest pain, palpitations, irregular heart beats.   Gastrointestinal:  No abdominal pain, nausea, vomiting, diarrhea, or constipation.   Genitourinary:  No dysuria, hematuria, urine urgency or frequency.   Integument/breast: Negative for rash, skin lesions, and pruritus.   Hematologic/lymphatic: Negative for easy bruising, bleeding, and lymphadenopathy.   Musculoskeletal: Open wounds, excoriations to b/l LEs.   Neurological: No focal neurologic deficits, seizures, tremors.   Psych:  No h/o anxiety, depression, other psych d/o.   Endocrine: No history of of diabetes, thyroid disorder.    Remainder of 12 point review of systems otherwise negative.    Vital signs in last 24 hours:  Patient Vitals for the past 24 hrs:   BP Temp Temp src Pulse Resp SpO2 Height Weight   06/12/24 1021 132/64 97.9 °F (36.6 °C) Oral 82 20 99 % 5' (1.524 m) 137 lb (62.1 kg)       Intake/Output:  No intake/output data recorded.    Physical Exam:   General: Awake, alert, non-tox and in NAD.   Head: Normocephalic, without obvious abnormality, atraumatic.   Eyes: Conjunctivae/corneas clear.  No scleral icterus.  No conjunctival     hemorrhage.   Nose: Nares normal.   Throat:  Oropharynx clear, MMs moist.   Neck:  Trachea ML, no masses.   Lungs: CTA b/l no rhonchi, rales, wheezes.   Chest wall: No tenderness or deformity.   Heart: Regular rate and rhythm, normal S1S2, no murmurs.   Abdomen: Soft, NT/ND.  Bowel sounds present.  No organomegaly.   Extremity: Edema, open wounds with excoriations to b/l LEs.   Skin: No rashes or lesions.   Neurological: No focal neurologic deficits.    Cultures:   Outpatient cultures with pansensitive pseudomonas    Assessment and Plan:    Complicated pseudomonas cellulitis to the b/l LEs with worsening wounds, drainage, and excoriations in spite of outpatient wound nursing services  - Outpatient cultures with pansensitive pseudomonas  - Was not able to tolerate p.o. ciprofloxacin due to dizziness  - Will start IV cefepime    2.  H/o renal insufficiency  - Will renally dose antibiotics    3.  Disposition - inpatient.  Start IV cefepime.  Awaiting labs to assure it is ok to place PICC with renal insufficiency.  Will need wound care evaluation and  for likely ECF for 2-3 weeks of IV Rx.  Trending temps and WBCs.  Will follow.    Urmila Owen DO, [facility] Infectious Disease  [phone]    2024  11:08 AM      Electronically signed by Urmila Owen DO on 2024  1:33 PM              Discharge Summary - D/C Summary      Discharge Summary signed by Mo Spain MD at 6/15/2024 10:44 AM  Version 1 of 1    Author: Mo Spain MD Service: Hospitalist Author Type: Physician    Filed: 6/15/2024 10:44 AM Date of Service: 6/15/2024 10:44 AM Status: Signed    : Mo Spain MD (Physician)       [facility]  Discharge Summary     Loreta Coy  : 1929    Status: Inpatient  Day #: 3    Attending: Mo Spain MD  PCP: Morteza Isabel MD     Date of Admission:  2024  Date of Discharge:  6/15/2024     Hospital Discharge Diagnoses:     B/l LE cellulitis with ulcerations  CKD stage 3-4  HTN  HL  Hypothyroidism  H/o DVT      History  of Present Illness:     Copied from Admission H&P:    The patient is a 89-year-old  female who had chronic lymphedema and cellulitis with superficial wounds both legs and heels for the last 2 to 3 weeks. She had outpatient cultures growing pansensitive Pseudomonas aeruginosa. Started on oral ciprofloxacin, which she could not tolerate secondary to intense dizziness. She was seen by Infectious Disease for consultation, Dr. Owen, yesterday, and today she was sent to the emergency department for evaluation to initiate IV cefepime. The patient had CBC which was unremarkable. Cefepime was initiated. Chemistry, blood cultures, C-reactive protein, and sedimentation rate are still pending.       Hospital Course:     B/l LE cellulitis with ulcerations  -culture + pansensitive PSAR  -Adverse reaction to cipro outpatient  -cont cefepime IV per ID x2-3 weeks. To f/u ID 1-2 weeks after discharge  -midline placed for outpatient IV abx     CKD stage 3-4  -stable. Monitor     HTN  -cont meds and monitor     HL  -cont statin     Hypothyroidism  -cont levothyroxine     [facility] D[facility]     Consultants         Provider   Role Specialty     Urmila Owen DO      Consulting Physician INFECTIOUS DISEASES             Physical Exam:   Blood pressure 138/82, pulse 65, temperature 98.1 °F (36.7 °C), temperature source Oral, resp. rate 18, height 5' (1.524 m), weight 132 lb 8 oz (60.1 kg), SpO2 98%.  General:  Alert, no distress  HEENT:  Normocephalic, atraumatic  Cardiac:  Regular rate, regular rhythm  Pulmonary:  Clear to auscultation bilaterally, respirations unlabored  Gastrointestinal:  Soft, non-tender, normal bowel sounds  Musculoskeletal:  No joint swelling  Extremities:  No edema, no cyanosis, no clubbing  Neurologic:  nonfocal  Psychiatric:  Normal affect, calm and appropriate         Discharge Medications        START taking these medications        Instructions Prescription details   cefepime HCl 1 GM/50ML  Soln  Commonly known as: MAXIPIME      Inject 50 mL (1 g total) into the vein daily for 14 days. Check once weekly CBC, CMP, CRP and fax to [phone].  PICC care q week.  Flushes per [facility] protocol.   Stop taking on: June 28, 2024  Quantity: 700 mL  Refills: 0            CHANGE how you take these medications        Instructions Prescription details   apixaban 5 MG Tabs  Commonly known as: Dong  What changed:   how much to take  how to take this  when to take this  additional instructions      Take 2 tabs (10mg) by mouth twice daily for 7 days, then take 1 tab (5mg) by mouth twice daily thereafter.   Quantity: 74 tablet  Refills: 0            CONTINUE taking these medications        Instructions Prescription details   ALLOPURINOL OR      Take 100 mg by mouth daily.   Refills: 0     amLODIPine 5 MG Tabs  Commonly known as: Norvasc      Take 1 tablet (5 mg total) by mouth Every afternoon at 2:00 pm.   Refills: 0     aspirin 81 MG Tbec      Take 1 tablet (81 mg total) by mouth daily.   Refills: 0     atorvastatin 20 MG Tabs  Commonly known as: Lipitor      Take 1 tablet (20 mg total) by mouth nightly.   Refills: 0     CALCIUM+D3 OR      Take by mouth.   Refills: 0     Cholecalciferol 125 MCG (5000 UT) Tabs  Commonly known as: VITAMIN D-3      Take 1 tablet (5,000 Units total) by mouth every 14 (fourteen) days.   Refills: 0     levothyroxine 88 MCG Tabs  Commonly known as: Synthroid      Take 1 tablet (88 mcg total) by mouth before breakfast.   Refills: 0     lisinopril 40 MG Tabs  Commonly known as: Prinivil; Zestril      Take 0.5 tablets (20 mg total) by mouth daily.   Refills: 0     metoprolol succinate ER 50 MG Tb24  Commonly known as: Toprol XL      Take 0.5 tablets (25 mg total) by mouth at bedtime.   Refills: 0               Where to Get Your Medications        Please  your prescriptions at the location directed by your doctor or nurse    Bring a paper prescription for each of these  medications  cefepime HCl 1 GM/50ML [facility]           Hospital Discharge Diagnoses:  b/l LE PSAR cellulitis with ulcerations    Lace+ Score: 47  59-90 High Risk  29-58 Medium Risk  0-28   Low Risk.    TCM Follow-Up Recommendation:  LACE 29-58: Moderate Risk of readmission after discharge from the hospital.        I spent >30 minutes on this discharge. Discussed treatment and discharge plans.       Mo Spain MD      Electronically signed by Mo Spain MD on 6/15/2024 10:44 AM              Physical Therapy Notes (last 72 hours)      Physical Therapy Note signed by Haase, Jenna, PT at 6/13/2024 12:38 PM  Version 1 of 1    Author: Haase, Jenna, PT Service: Rehab Author Type: Physical Therapist    Filed: 6/13/2024 12:38 PM Date of Service: 6/13/2024 10:28 AM Status: Signed    : Haase, Jenna, PT (Physical Therapist)       PHYSICAL THERAPY EVALUATION - INPATIENT     Room Number: 572/572-A  Evaluation Date: 6/13/2024  Type of Evaluation: Initial   Physician Order: PT Eval and Treat    Presenting Problem: cellulitis of lower extremity  Co-Morbidities : Hx HTN, CKD3, renal insufficiency  Reason for Therapy: Mobility Dysfunction and Discharge Planning    PHYSICAL THERAPY ASSESSMENT   Patient is a 89 year old female admitted 6/12/2024 for cellulitis of lower extremity. Prior to admission, patient's baseline is Mod I > Independent for ADLs/functional mobility with use of cane and rolling walker. Patient is currently functioning below baseline with bed mobility, transfers, gait, stair negotiation, standing prolonged periods, and performing household tasks.  Patient is requiring contact guard assist and minimal assist as a result of the following impairments: decreased functional strength, decreased endurance/aerobic capacity, pain, impaired standing balance, decreased muscular endurance, and medical status.  Physical Therapy will continue to follow for duration of hospitalization.    Patient will benefit from continued skilled  PT Services at discharge to promote functional independence and safety with additional support and return home with home health PT.    PLAN  PT Treatment Plan: Bed mobility;Body mechanics;Coordination;Endurance;Energy conservation;Patient education;Gait training;Strengthening;Transfer training;Balance training  Rehab Potential : Good  Frequency (Obs): 5x/week    PHYSICAL THERAPY MEDICAL/SOCIAL HISTORY     Problem List  Principal Problem:    Cellulitis of lower extremity, unspecified laterality  Active Problems:    Cellulitis      HOME SITUATION  Home Situation  Type of Home: House  Home Layout: One level  Stairs to Enter : 2 (1 + 1)  Railing: No  Lives With:  (2 grandsons)  Drives: No  Patient Owned Equipment: Rolling walker;Cane  Patient Regularly Uses: Hearing aides     SUBJECTIVE  \"I can't drive, I have low vision\"    PHYSICAL THERAPY EXAMINATION   OBJECTIVE  Precautions: Bed/chair alarm;Hard of hearing;Low vision (legally blind)  Fall Risk: Standard fall risk    WEIGHT BEARING RESTRICTION  Weight Bearing Restriction: None    PAIN ASSESSMENT  Rating: Unable to rate  Location: BLEs, L > R  Management Techniques: Activity promotion;Body mechanics;Repositioning    COGNITION  Overall Cognitive Status:  WFL - within functional limits    RANGE OF MOTION AND STRENGTH ASSESSMENT  Upper extremity ROM and strength are within functional limits   Lower extremity ROM is within functional limits   Lower extremity strength is within functional limits     BALANCE  Static Sitting: Good  Dynamic Sitting: Fair +  Static Standing: Fair  Dynamic Standing: Fair -    ACTIVITY TOLERANCE  Pulse: 82  Heart Rate Source: Monitor     BP: 136/67  BP Location: Right arm  BP Method: Automatic  Patient Position: Sitting    O2 WALK  Oxygen Therapy  SPO2% Ambulation on Room Air: 98    AM-PAC '6-Clicks' INPATIENT SHORT FORM - BASIC MOBILITY  How much difficulty does the patient currently have...  Patient Difficulty: Turning over in bed (including  adjusting bedclothes, sheets and blankets)?: A Little   Patient Difficulty: Sitting down on and standing up from a chair with arms (e.g., wheelchair, bedside commode, etc.): A Little   Patient Difficulty: Moving from lying on back to sitting on the side of the bed?: A Little   How much help from another person does the patient currently need...   Help from Another: Moving to and from a bed to a chair (including a wheelchair)?: A Little   Help from Another: Need to walk in hospital room?: A Little   Help from Another: Climbing 3-5 steps with a railing?: A Lot     AM-PAC Score:  Raw Score: 17   Approx Degree of Impairment: 50.57%   Standardized Score (AM-PAC Scale): 42.13   CMS Modifier (G-Code): CK    FUNCTIONAL ABILITY STATUS  Functional Mobility/Gait Assessment  Gait Assistance: Contact guard assist  Distance (ft): 8 ft and 20 ft and 5 ft  Assistive Device: Rolling walker;Cane  Pattern: Shuffle (decreased pavel speed, decreased step length, flexed posture. Patient utilizing cane for short distance ambulation to bedside chair. Patient prefers to carry her cane with her at all times, even when utilizing the RW.)  Sit to Stand: minimal assist from toilet    Exercise/Education Provided:  Bed mobility  Body mechanics  Energy conservation  Functional activity tolerated  Gait training  Posture  Transfer training    The patient's Approx Degree of Impairment: 50.57% has been calculated based on documentation in the [facility] '6 clicks' Inpatient Basic Mobility Short Form.  Research supports that patients with this level of impairment may benefit from HHPT.  Final disposition will be made by interdisciplinary medical team.    Patient in bathroom upon arrival. RN approved activity. Educated patient on POC and benefits of mobilization. Agreeable to participate. Patient reporting BLE (L > R) pain, not quantified per the pain scale.  Patient End of Session: Up in chair;Needs met;Call light within reach;RN aware of  session/findings;All patient questions and concerns addressed    CURRENT GOALS  Goals to be met by: 6/20/24  Patient Goal Patient's self-stated goal is: go home   Goal #1 Patient is able to demonstrate supine - sit EOB @ level: supervision     Goal #1   Current Status    Goal #2 Patient is able to demonstrate transfers Sit to/from Stand at assistance level: supervision with walker - rolling     Goal #2  Current Status    Goal #3 Patient is able to ambulate 75 feet with assist device: walker - rolling at assistance level: supervision   Goal #3   Current Status    Goal #4 Patient will negotiate 2 stairs/one curb w/ assistive device and supervision   Goal #4   Current Status    Goal #5 Patient to demonstrate independence with home activity/exercise instructions provided to patient in preparation for discharge.   Goal #5   Current Status      Patient Evaluation Complexity Level:  History Moderate - 1 or 2 personal factors and/or co-morbidities   Examination of body systems Moderate - addressing a total of 3 or more elements   Clinical Presentation Low- Stable   Clinical Decision Making  Low Complexity     Therapeutic Activity:  10 minutes                Occupational Therapy Notes (last 72 hours)      Occupational Therapy Note signed by India Suarez OT at 6/13/2024 12:39 PM  Version 1 of 1    Author: India Suarez OT Service: — Author Type: Occupational Therapist    Filed: 6/13/2024 12:39 PM Date of Service: 6/13/2024 10:50 AM Status: Signed    : India Suarez OT (Occupational Therapist)       OCCUPATIONAL THERAPY EVALUATION - INPATIENT     Room Number: 572/572-A  Evaluation Date: 6/13/2024  Type of Evaluation: Initial  Presenting Problem: B LE cellulitis with infected wounds  Hx HTN, CKD3, renal insufficiency     Physician Order: IP Consult to Occupational Therapy  Reason for Therapy: ADL/IADL Dysfunction and Discharge Planning    OCCUPATIONAL THERAPY ASSESSMENT   Patient is a 89 year old female  admitted 6/12/2024 for B LE cellulitis with infected wounds.  Prior to admission, patient's baseline is independent with I/ADLs, including laundry and cooking. MI for fx mobility using RW or SBQC in home d/t limited space to navigate with RW.  Patient is currently functioning near baseline with ADLs and fx mobility/transfers.  Patient is requiring up to mod assist for ADLs as a result of the following impairments: decreased functional strength, decreased functional reach, decreased endurance, pain, impaired balance, and decreased muscular endurance. Occupational Therapy will continue to follow for duration of hospitalization.    Patient will benefit from continued skilled OT Services at discharge to promote functional independence and safety with additional support and return home with home health OT    PLAN  OT Treatment Plan: Energy conservation/work simplification techniques;Balance activities;ADL training;Functional transfer training;Endurance training;Patient/Family education;Patient/Family training;Equipment eval/education;Compensatory technique education     OCCUPATIONAL THERAPY MEDICAL/SOCIAL HISTORY   Problem List  Principal Problem:    Cellulitis of lower extremity, unspecified laterality  Active Problems:    Cellulitis    HOME SITUATION  Type of Home: House  Home Layout: One level  Lives With: -- (2 grandsons)  Toilet and Equipment: Comfort height toilet  Shower/Tub and Equipment: Tub-shower combo; Tub transfer bench  Other Equipment: Reacher  Occupation/Status: Retired  Hand Dominance: Right  Drives: No  Patient Regularly Uses: Hearing aides  Stairs in Home: 1 THIERRY  Use of Assistive Device(s): SBQC, RW    SUBJECTIVE  Patient agreeable to OT evaluation.    OCCUPATIONAL THERAPY EXAMINATION    OBJECTIVE  Precautions: Bed/chair alarm; Hard of hearing; Low vision (legally blind)  Fall Risk: Standard fall risk    PAIN ASSESSMENT  Rating: -- (not rated)  Location: B LE  Management Techniques: Activity promotion;  Body mechanics; Repositioning; Nurse notified    ACTIVITY TOLERANCE  Pulse: 82  Heart Rate Source: Monitor     BP: 136/67  BP Location: Right arm  BP Method: Automatic  Patient Position: Sitting    O2 SATURATIONS  Oxygen Therapy  SPO2% Ambulation on Room Air: 98    COGNITION  Overall Cognitive Status:  WFL - within functional limits    VISION  Legally blind    SENSATION  Light touch:  intact    RANGE OF MOTION   Upper extremity ROM is within functional limits     STRENGTH ASSESSMENT  Upper extremity strength is within functional limits     ACTIVITIES OF DAILY LIVING ASSESSMENT  AM-PAC ‘6-Clicks’ Inpatient Daily Activity Short Form  How much help from another person does the patient currently need…  -   Putting on and taking off regular lower body clothing?: A Little  -   Bathing (including washing, rinsing, drying)?: A Little  -   Toileting, which includes using toilet, bedpan or urinal? : A Little  -   Putting on and taking off regular upper body clothing?: A Little  -   Taking care of personal grooming such as brushing teeth?: A Little  -   Eating meals?: None    AM-PAC Score:  Score: 19  Approx Degree of Impairment: 42.8%  Standardized Score (AM-PAC Scale): 40.22  CMS Modifier (G-Code): CK    BED MOBILITY  Not Tested - patient in bathroom upon therapist arrival     FUNCTIONAL TRANSFER ASSESSMENT  Sit to Stand from Toilet: Min assist  Chair Transfer: CGA    FUNCTIONAL MOBILITY  CGA for in-room fx mobility using RW    FUNCTIONAL ADL ASSESSMENT  Eating: setup assist (per obs)   Grooming: contact guard assist standing at sink  UB Dressing: setup assist  LB Dressing: mod assist  Toileting: min assist    EDUCATION PROVIDED  Patient: Role of Occupational Therapy; Discharge Recommendations; Plan of Care; Functional Transfer Techniques; Fall Prevention; Posture/Positioning; Energy Conservation; Proper Body Mechanics  Patient's Response to Education: Verbalized Understanding; Returned Demonstration    The patient's Approx  Degree of Impairment: 42.8% has been calculated based on documentation in the [facility] '6 clicks' Inpatient Daily Activity Short Form.  Research supports that patients with this level of impairment may benefit from HH OT.  Final disposition will be made by interdisciplinary medical team.     Patient End of Session: Up in chair;Needs met;Call light within reach;RN aware of session/findings;All patient questions and concerns addressed;Alarm set    OT Goals  Patients self stated goal is: none stated     Patient will complete functional transfer with SUP  Comment:     Patient will complete toileting with SUP  Comment:     Patient will complete LB dresisng with SUP  Comment:    Patient will complete item retrieval with SUP  Comment:          Goals  on: 24  Frequency: 3-5x/week    Patient Evaluation Complexity Level:   Occupational Profile/Medical History MODERATE - Expanded review of history including review of medical or therapy record   Specific performance deficits impacting engagement in ADL/IADL MODERATE  3 - 5 performance deficits   Client Assessment/Performance Deficits MODERATE - Comorbidities and min to mod modifications of tasks    Clinical Decision Making MODERATE - Analysis of occupational profile, detailed assessments, several treatment options    Overall Complexity MODERATE     OT Session Time  Self-Care Home Management: 10 minutes    SEMAJ Vasquez/L  [facility]  #[id number]             Video Swallow Study Notes    No notes of this type exist for this encounter.     SLP Notes    No notes of this type exist for this encounter.     Multidisciplinary Problems     Active Goals     Not on file          Resolved Goals        Problem: Patient/Family Goals    Goal Priority Disciplines Outcome Interventions   Patient/Family Long Term Goal   (Resolved)     Interdisciplinary Completed    Description: Patient's Long Term Goal: be discharged    Interventions:  -monitor VS  -monitor appropriate  labs  -update patient and family on plan of care  -follow MD orders     - See additional Care Plan goals for specific interventions   Patient/Family Short Term Goal   (Resolved)     Interdisciplinary Completed    Description: Patient's Short Term Goal: feel better    Interventions:   -monitor VS  -monitor appropriate labs  -update patient and family on plan of care  -discharge planning  -follow MD orders     - See additional Care Plan goals for specific interventions               Discharge Treatment Preferences    Flowsheet Row Most Recent Value   Preferences    Submitted to [facility] Yes   PASRR Level 1 Submitted Yes

## (undated) NOTE — LETTER
Hospital Discharge Documentation  Please phone to schedule a hospital follow up appointment. No discharge summary available at this time, below is the most recent progress note  for your review .       From: [id number] [facility]  Phone: [phone] Smokeless tobacco: Never Used                            Allergies: No Known Allergies     Medications : AmLODIPine Besylate 5 MG Oral Tab,  Take 5 mg by mouth daily. aspirin 81 MG Oral Tab EC,  Take 81 mg by mouth daily.    atorvastatin 20 MG Oral Tab, R leg weakness  -Possibly related to muscle injury, lumbar stenosis, neuropathy  -CVA less likely  -CT head pending  -MRI lumbar spine pending  -PT/OT     Hypothyroidism  -home med     HL  -home meds     DVT proph: heparin sq        **Certification